# Patient Record
Sex: FEMALE | Race: WHITE | NOT HISPANIC OR LATINO | Employment: FULL TIME | URBAN - METROPOLITAN AREA
[De-identification: names, ages, dates, MRNs, and addresses within clinical notes are randomized per-mention and may not be internally consistent; named-entity substitution may affect disease eponyms.]

---

## 2017-01-12 DIAGNOSIS — F41.8 DEPRESSION WITH ANXIETY: Primary | ICD-10-CM

## 2017-01-12 RX ORDER — ALPRAZOLAM 0.5 MG
0.5 TABLET ORAL 3 TIMES DAILY PRN
Qty: 12 TABLET | Refills: 0 | Status: SHIPPED | OUTPATIENT
Start: 2017-01-12 | End: 2017-01-12

## 2017-01-12 RX ORDER — ALPRAZOLAM 0.5 MG
0.5 TABLET ORAL DAILY PRN
Qty: 12 TABLET | Refills: 0 | Status: SHIPPED | OUTPATIENT
Start: 2017-01-12 | End: 2017-02-13

## 2017-01-12 NOTE — TELEPHONE ENCOUNTER
Refill request for: ALPRAZOLAM 0.5 MG    Last rx written: 12/29/16 # 12 w/ 0 refills  **[place]  reviewed- last filled: 12/29/16  Last OV: 12/29/16  for complication of IUD    Unable to fill per standing order routed to Dr. David for approval.    Patient has psychiatry appointment February 1st, Dr. Villafana. Looking for refill until the established.     Fax RX to [facility].  Call when faxed [phone], ok to leave detailed message.     Kate Huizar RN

## 2017-01-12 NOTE — TELEPHONE ENCOUNTER
Agree with psychiatry referral for help with management.  Script printed, signed, and in station out basket.

## 2017-01-12 NOTE — TELEPHONE ENCOUNTER
Faxed RX to patients pharmacy, spoke to patient and informed her - verbalized understanding.     Lydia Talavera MA

## 2017-01-20 ENCOUNTER — TRANSFERRED RECORDS (OUTPATIENT)
Dept: HEALTH INFORMATION MANAGEMENT | Facility: CLINIC | Age: 32
End: 2017-01-20

## 2017-01-26 ENCOUNTER — TRANSFERRED RECORDS (OUTPATIENT)
Dept: HEALTH INFORMATION MANAGEMENT | Facility: CLINIC | Age: 32
End: 2017-01-26

## 2017-01-27 DIAGNOSIS — F98.8 ADD (ATTENTION DEFICIT DISORDER) WITHOUT HYPERACTIVITY: Primary | ICD-10-CM

## 2017-01-27 NOTE — TELEPHONE ENCOUNTER
Refill request for: ADDERALL 20 MG - 40 MG BID    Last rx written: 12/30/16 # 120    **[place]  reviewed- last filled: 12/30/16  Last OV: 5/25/16  for ADD    Unable to fill per standing order routed to Dr. David for approval.    Place RX up front. Call [phone] when this has been done.    Patient thought she needed a PA for this medication. On chart review, PA was done, see 11/10/16 telephone encounter or scanned in document from 12/2/16.    Kate Huizar RN

## 2017-01-30 RX ORDER — DEXTROAMPHETAMINE SACCHARATE, AMPHETAMINE ASPARTATE, DEXTROAMPHETAMINE SULFATE AND AMPHETAMINE SULFATE 5; 5; 5; 5 MG/1; MG/1; MG/1; MG/1
40 TABLET ORAL 2 TIMES DAILY
Qty: 120 TABLET | Refills: 0 | Status: SHIPPED | OUTPATIENT
Start: 2017-01-30 | End: 2017-02-28

## 2017-02-13 DIAGNOSIS — F41.8 DEPRESSION WITH ANXIETY: ICD-10-CM

## 2017-02-13 NOTE — TELEPHONE ENCOUNTER
FYI patient no showed her 2/1/17 psychiatry appointment.    Refill request for: ALPRAZOLAM 0.5 MG QD PRN    Last rx written: 1/12/17 # 12 w/ 0 refills.     **[place]  reviewed- last filled: 1/12/17  Last OV with Dr. David: 5/25/16 for physical    Unable to fill per standing order routed to Dr. David for approval.    Fax RX to Ira Huizar RN

## 2017-02-14 ENCOUNTER — OFFICE VISIT (OUTPATIENT)
Dept: FAMILY MEDICINE | Facility: CLINIC | Age: 32
End: 2017-02-14
Payer: COMMERCIAL

## 2017-02-14 VITALS
SYSTOLIC BLOOD PRESSURE: 112 MMHG | WEIGHT: 136 LBS | DIASTOLIC BLOOD PRESSURE: 68 MMHG | BODY MASS INDEX: 21.35 KG/M2 | HEIGHT: 67 IN | OXYGEN SATURATION: 98 % | TEMPERATURE: 98.8 F | HEART RATE: 100 BPM

## 2017-02-14 DIAGNOSIS — B96.89 BV (BACTERIAL VAGINOSIS): Primary | ICD-10-CM

## 2017-02-14 DIAGNOSIS — N76.0 BV (BACTERIAL VAGINOSIS): Primary | ICD-10-CM

## 2017-02-14 DIAGNOSIS — F43.0 ACUTE REACTION TO STRESS: ICD-10-CM

## 2017-02-14 DIAGNOSIS — F41.9 ANXIETY: ICD-10-CM

## 2017-02-14 DIAGNOSIS — K60.2 ANAL FISSURE: ICD-10-CM

## 2017-02-14 LAB
ALBUMIN UR-MCNC: 30 MG/DL
APPEARANCE UR: CLEAR
BACTERIA #/AREA URNS HPF: ABNORMAL /HPF
BILIRUB UR QL STRIP: NEGATIVE
COLOR UR AUTO: YELLOW
GLUCOSE UR STRIP-MCNC: NEGATIVE MG/DL
HGB UR QL STRIP: ABNORMAL
KETONES UR STRIP-MCNC: NEGATIVE MG/DL
LEUKOCYTE ESTERASE UR QL STRIP: NEGATIVE
MICRO REPORT STATUS: ABNORMAL
MUCOUS THREADS #/AREA URNS LPF: PRESENT /LPF
NITRATE UR QL: NEGATIVE
NON-SQ EPI CELLS #/AREA URNS LPF: ABNORMAL /LPF
PH UR STRIP: 7 PH (ref 5–7)
RBC #/AREA URNS AUTO: ABNORMAL /HPF (ref 0–2)
SP GR UR STRIP: 1.02 (ref 1–1.03)
SPECIMEN SOURCE: ABNORMAL
URN SPEC COLLECT METH UR: ABNORMAL
UROBILINOGEN UR STRIP-ACNC: 0.2 EU/DL (ref 0.2–1)
WBC #/AREA URNS AUTO: ABNORMAL /HPF (ref 0–2)
WET PREP SPEC: ABNORMAL

## 2017-02-14 PROCEDURE — 87210 SMEAR WET MOUNT SALINE/INK: CPT | Performed by: NURSE PRACTITIONER

## 2017-02-14 PROCEDURE — 99214 OFFICE O/P EST MOD 30 MIN: CPT | Performed by: NURSE PRACTITIONER

## 2017-02-14 PROCEDURE — 81001 URINALYSIS AUTO W/SCOPE: CPT | Performed by: NURSE PRACTITIONER

## 2017-02-14 RX ORDER — METRONIDAZOLE 500 MG/1
500 TABLET ORAL 2 TIMES DAILY
Qty: 14 TABLET | Refills: 0 | Status: SHIPPED | OUTPATIENT
Start: 2017-02-14 | End: 2017-05-28

## 2017-02-14 RX ORDER — LORAZEPAM 0.5 MG/1
0.5 TABLET ORAL EVERY 12 HOURS
Qty: 30 TABLET | Refills: 1 | Status: CANCELLED | OUTPATIENT
Start: 2017-02-14

## 2017-02-14 NOTE — TELEPHONE ENCOUNTER
Please contact patient to see when she has most recently seen psychiatry - based on the last notes, it was assumed that they would take over prescribing xanax.

## 2017-02-14 NOTE — PROGRESS NOTES
"  SUBJECTIVE:                                                    Citlali Hawley is a 31 year old female who presents to clinic today for the following health issues:      Vaginal Symptoms      Duration: x 7 days    Description  Odor and discharge    Intensity:  moderate    Accompanying signs and symptoms (fever/dysuria/abdominal or back pain): None    History  Sexually active: yes, single partner, contraception - IUD  Possibility of pregnancy: No  Recent antibiotic use: no     Precipitating or alleviating factors: None    Therapies tried and outcome: none      Patient is here with complaints of vaginal discharge that is thick and foul smelling. Sexually active with the same male partner for the past year. Never has had any type of discharge like this. Slight irritation with urination but no back pain or nausea. Has issues with an anal fissure as well. Given medication topically in the past. No douching but has used a new soap.         Problem list and histories reviewed & adjusted, as indicated.  Additional history: none    Problem list, Medication list, Allergies, and Medical/Social/Surgical histories reviewed in [facility] and updated as appropriate.    ROS:  Constitutional, HEENT, cardiovascular, pulmonary, gi and gu systems are negative, except as otherwise noted.    OBJECTIVE:                                                    /68 (BP Location: Right arm, Patient Position: Chair, Cuff Size: Adult Regular)  Pulse 100  Temp 98.8  F (37.1  C) (Oral)  Ht 5' 6.75\" (1.695 m)  Wt 136 lb (61.7 kg)  SpO2 98%  BMI 21.46 kg/m2  Body mass index is 21.46 kg/(m^2).  GENERAL: healthy, alert and no distress  BACK: no CVA tenderness, no paralumbar tenderness  PSYCH: mentation appears normal, affect normal/bright    Diagnostic Test Results:  Results for orders placed or performed in visit on 02/14/17 (from the past 24 hour(s))   *UA reflex to Microscopic and Culture ([facility] (except [facility]" and [place])   Result Value Ref Range    Color Urine Yellow     Appearance Urine Clear     Glucose Urine Negative NEG mg/dL    Bilirubin Urine Negative NEG    Ketones Urine Negative NEG mg/dL    Specific Gravity Urine 1.025 1.003 - 1.035    Blood Urine Trace (A) NEG    pH Urine 7.0 5.0 - 7.0 pH    Protein Albumin Urine 30 (A) NEG mg/dL    Urobilinogen Urine 0.2 0.2 - 1.0 EU/dL    Nitrite Urine Negative NEG    Leukocyte Esterase Urine Negative NEG    Source Midstream Urine    Urine Microscopic   Result Value Ref Range    WBC Urine 5-10 (A) 0 - 2 /HPF    RBC Urine 2-5 (A) 0 - 2 /HPF    Squamous Epithelial /LPF Urine Few FEW /LPF    Bacteria Urine Few (A) NEG /HPF    Mucous Urine Present (A) NEG /LPF   Wet prep   Result Value Ref Range    Specimen Description Vagina     Wet Prep (A)      No Trichomonas seen  Clue cells seen  No yeast seen      Micro Report Status FINAL 02/14/2017         ASSESSMENT/PLAN:                                                            1. BV (bacterial vaginosis)  Encouraged use of probiotics and avoiding all scented soaps and detergents.   - metroNIDAZOLE (FLAGYL) 500 MG tablet; Take 1 tablet (500 mg) by mouth 2 times daily  Dispense: 14 tablet; Refill: 0    2. Anal fissure  Will try anusol for fissure discomfort. Emphasis on avoiding constipation thru good water intake and regular exercise.   - hydrocortisone (ANUSOL-HC) 2.5 % cream; Place rectally 2 times daily  Dispense: 15 g; Refill: 0    Follow up if symptoms do not improve.     Juju Cuello, NP  [facility]

## 2017-02-14 NOTE — TELEPHONE ENCOUNTER
Patient calling to check on status.     She reports she had a RN exam on 2/1/17, so she couldn't make the psychiatry appointment. She has rescheduled the appointment for 2/22/17.

## 2017-02-14 NOTE — NURSING NOTE
"Chief Complaint   Patient presents with     UTI     possible       Initial /68 (BP Location: Right arm, Patient Position: Chair, Cuff Size: Adult Regular)  Pulse 100  Temp 98.8  F (37.1  C) (Oral)  Ht 5' 6.75\" (1.695 m)  Wt 136 lb (61.7 kg)  SpO2 98%  BMI 21.46 kg/m2 Estimated body mass index is 21.46 kg/(m^2) as calculated from the following:    Height as of this encounter: 5' 6.75\" (1.695 m).    Weight as of this encounter: 136 lb (61.7 kg).  Medication Reconciliation: complete     Gorge Ogden CMA    advsided is due as of 2/14/2017.Gorge Ogden CMA      "

## 2017-02-14 NOTE — TELEPHONE ENCOUNTER
LORazepam (ATIVAN) 0.5 MG tablet  Last Written Prescription Date:  2/11/16  Last Fill Quantity: 42,   # refills: 0  Last Office Visit with [facility], [facility] or [facility] prescribing provider: 11/15/16 Shira Guevara and 5/25/16 Dr. David  Future Office visit:       Routing refill request to provider for review/approval because:  Drug not on the [facility], [facility] or [facility] refill protocol or controlled substance

## 2017-02-14 NOTE — MR AVS SNAPSHOT
"              After Visit Summary   2017    Citlali Hawley    MRN: [id number]           Patient Information     Date Of Birth          1985        Visit Information        Provider Department      2017 11:20 AM Juju Cuello NP [facility]        Today's Diagnoses     Vaginal discharge    -  1    BV (bacterial vaginosis)        Anal fissure           Follow-ups after your visit        Who to contact     If you have questions or need follow up information about today's clinic visit or your schedule please contact [facility] directly at [phone].  Normal or non-critical lab and imaging results will be communicated to you by [employer], letter or phone within 4 business days after the clinic has received the results. If you do not hear from us within 7 days, please contact the clinic through [employer] or phone. If you have a critical or abnormal lab result, we will notify you by phone as soon as possible.  Submit refill requests through [employer] or call your pharmacy and they will forward the refill request to us. Please allow 3 business days for your refill to be completed.          Additional Information About Your Visit        MyChart Information     [employer] lets you send messages to your doctor, view your test results, renew your prescriptions, schedule appointments and more. To sign up, go to [email or web address] . Click on \"Log in\" on the left side of the screen, which will take you to the Welcome page. Then click on \"Sign up Now\" on the right side of the page.     You will be asked to enter the access code listed below, as well as some personal information. Please follow the directions to create your username and password.     Your access code is: [email or web address]  Expires: 3/29/2017  5:05 PM     Your access code will  in 90 days. If you need help or a new code, please call your [facility] or [phone].        Care EveryWhere ID     This is your Care " "EveryWhere ID. This could be used by other organizations to access your [facility] medical records  [id number]        Your Vitals Were     Pulse Temperature Height Pulse Oximetry BMI (Body Mass Index)       100 98.8  F (37.1  C) (Oral) 5' 6.75\" (1.695 m) 98% 21.46 kg/m2        Blood Pressure from Last 3 Encounters:   02/14/17 112/68   12/29/16 100/80   11/15/16 102/58    Weight from Last 3 Encounters:   02/14/17 136 lb (61.7 kg)   12/29/16 142 lb 4.8 oz (64.5 kg)   11/15/16 149 lb 1.6 oz (67.6 kg)              We Performed the Following     *UA reflex to Microscopic and Culture ([facility] and [facility] (except [place] and [place])     Urine Microscopic     Wet prep          Today's Medication Changes          These changes are accurate as of: 2/14/17 12:11 PM.  If you have any questions, ask your nurse or doctor.               Start taking these medicines.        Dose/Directions    hydrocortisone 2.5 % cream   Commonly known as:  ANUSOL-HC   Used for:  Anal fissure   Started by:  Juju Cuello NP        Place rectally 2 times daily   Quantity:  15 g   Refills:  0       metroNIDAZOLE 500 MG tablet   Commonly known as:  FLAGYL   Used for:  BV (bacterial vaginosis)   Started by:  Juju Cuello NP        Dose:  500 mg   Take 1 tablet (500 mg) by mouth 2 times daily   Quantity:  14 tablet   Refills:  0            Where to get your medicines      These medications were sent to [id number] - [place] - [address]  AT [address] [place] [address] [place] [address]     Phone:  [phone]     hydrocortisone 2.5 % cream    metroNIDAZOLE 500 MG tablet                Primary Care Provider Office Phone # Fax #    Serina David [phone]       [facility] [address] [place] [address]        Thank you!     Thank you for choosing [facility]  for your care. Our goal is always to provide you with excellent " care. Hearing back from our patients is one way we can continue to improve our services. Please take a few minutes to complete the written survey that you may receive in the mail after your visit with us. Thank you!             Your Updated Medication List - Protect others around you: Learn how to safely use, store and throw away your medicines at www.disposemymeds.org.          This list is accurate as of: 2/14/17 12:11 PM.  Always use your most recent med list.                   Brand Name Dispense Instructions for use    ALPRAZolam 0.5 MG tablet    XANAX    12 tablet    Take 1 tablet (0.5 mg) by mouth daily as needed for anxiety       amphetamine-dextroamphetamine 20 MG per tablet    ADDERALL    120 tablet    Take 2 tablets (40 mg) by mouth 2 times daily In the afternoon. Patient needs generic brand with BARR        FETZIMA 40 MG 24 hr capsule   Generic drug:  levomilnacipran      TK 1 C PO D       hydrocortisone 2.5 % cream    ANUSOL-HC    15 g    Place rectally 2 times daily       lamoTRIgine 25 MG tablet    LaMICtal         levonorgestrel 20 MCG/24HR IUD    MIRENA    1 each    1 each by Intrauterine route once.       metroNIDAZOLE 500 MG tablet    FLAGYL    14 tablet    Take 1 tablet (500 mg) by mouth 2 times daily

## 2017-02-15 RX ORDER — ALPRAZOLAM 0.5 MG
0.5 TABLET ORAL DAILY PRN
Qty: 10 TABLET | Refills: 0 | Status: SHIPPED | OUTPATIENT
Start: 2017-02-15 | End: 2017-06-13

## 2017-02-15 NOTE — TELEPHONE ENCOUNTER
Script printed, signed, and in station out basket.    Needs to be seen either by psychiatry or here prior to receiving any additional refills.

## 2017-02-15 NOTE — TELEPHONE ENCOUNTER
Patient calling stating she is leaving town in one hour.  RX faxed to pharmacy.  Notified her to schedule appointment prior to needing further refills.  Voices understanding of instructions.  LAMONT Gray RN

## 2017-02-16 DIAGNOSIS — F41.9 ANXIETY: ICD-10-CM

## 2017-02-16 DIAGNOSIS — F43.0 ACUTE REACTION TO STRESS: ICD-10-CM

## 2017-02-16 RX ORDER — LORAZEPAM 0.5 MG/1
0.5 TABLET ORAL EVERY 12 HOURS
Qty: 42 TABLET | Refills: 0 | Status: CANCELLED | OUTPATIENT
Start: 2017-02-16

## 2017-02-16 NOTE — TELEPHONE ENCOUNTER
Please call patient at [phone] regarding taking both medications, LORazepam (ATIVAN) 0.5 MG tablet & ALPRAZolam (XANAX) 0.5 MG tablet.  Patient says needs to take both. Please call patient to advise and clarify.    Thank you,    Anali Jung

## 2017-02-16 NOTE — TELEPHONE ENCOUNTER
Agree with below - can't be on both ativan and xanax.  Xanax was just refilled.  Needs to follow up with psychiatry.

## 2017-02-16 NOTE — TELEPHONE ENCOUNTER
I explained to the patient the other day that she can't take both of these medications, just one or the other and that we have only refilled the Alprazolam recently for her.   Will route to Dr. David.

## 2017-02-28 DIAGNOSIS — F98.8 ADD (ATTENTION DEFICIT DISORDER) WITHOUT HYPERACTIVITY: ICD-10-CM

## 2017-02-28 RX ORDER — DEXTROAMPHETAMINE SACCHARATE, AMPHETAMINE ASPARTATE, DEXTROAMPHETAMINE SULFATE AND AMPHETAMINE SULFATE 5; 5; 5; 5 MG/1; MG/1; MG/1; MG/1
40 TABLET ORAL 2 TIMES DAILY
Qty: 120 TABLET | Refills: 0 | Status: SHIPPED | OUTPATIENT
Start: 2017-02-28 | End: 2017-03-27

## 2017-02-28 NOTE — TELEPHONE ENCOUNTER
Refill request for: ADDERALL 20 MG - 40 MG BID    Last rx written: 1/30/17 # 120    **[place]  reviewed- last filled: 1/30/17  Last OV: 11/15/16  for depression w/ anxiety    Unable to fill per standing order routed to Dr. David for approval.    Place RX up front. Call [phone] when this has been done.    Kate Huizar RN

## 2017-03-27 DIAGNOSIS — F98.8 ADD (ATTENTION DEFICIT DISORDER) WITHOUT HYPERACTIVITY: ICD-10-CM

## 2017-03-27 RX ORDER — DEXTROAMPHETAMINE SACCHARATE, AMPHETAMINE ASPARTATE, DEXTROAMPHETAMINE SULFATE AND AMPHETAMINE SULFATE 5; 5; 5; 5 MG/1; MG/1; MG/1; MG/1
40 TABLET ORAL 2 TIMES DAILY
Qty: 120 TABLET | Refills: 0 | Status: SHIPPED | OUTPATIENT
Start: 2017-03-27 | End: 2017-04-24

## 2017-03-27 NOTE — TELEPHONE ENCOUNTER
Patient will  at . [phone] ok to lm.   Adderall 20 MG      Last Written Prescription Date:  2/28/17  Last Fill Quantity: 120,   # refills: 0  Last Office Visit with [facility], [facility] or  Health prescribing provider: 2/14/17  Future Office visit:      checked    Routing refill request to provider for review/approval because:  Drug not on the [facility], [facility] or  [email or web address] refill protocol or controlled substance

## 2017-04-20 ENCOUNTER — TRANSFERRED RECORDS (OUTPATIENT)
Dept: HEALTH INFORMATION MANAGEMENT | Facility: CLINIC | Age: 32
End: 2017-04-20

## 2017-04-24 DIAGNOSIS — F98.8 ADD (ATTENTION DEFICIT DISORDER) WITHOUT HYPERACTIVITY: ICD-10-CM

## 2017-04-24 NOTE — TELEPHONE ENCOUNTER
amphetamine-dextroamphetamine (ADDERALL) 20 MG per tablet      Last Written Prescription Date: 03/27/17  Last Fill Quantity: 120,  # refills: 0   Last Office Visit with [facility], [facility] or [facility] prescribing provider: 11/15/16                                              Call patient when ready.  Knows to  downstairs.

## 2017-04-25 RX ORDER — DEXTROAMPHETAMINE SACCHARATE, AMPHETAMINE ASPARTATE, DEXTROAMPHETAMINE SULFATE AND AMPHETAMINE SULFATE 5; 5; 5; 5 MG/1; MG/1; MG/1; MG/1
40 TABLET ORAL 2 TIMES DAILY
Qty: 120 TABLET | Refills: 0 | Status: SHIPPED | OUTPATIENT
Start: 2017-04-25 | End: 2017-05-22

## 2017-05-22 DIAGNOSIS — F98.8 ADD (ATTENTION DEFICIT DISORDER) WITHOUT HYPERACTIVITY: ICD-10-CM

## 2017-05-22 RX ORDER — DEXTROAMPHETAMINE SACCHARATE, AMPHETAMINE ASPARTATE, DEXTROAMPHETAMINE SULFATE AND AMPHETAMINE SULFATE 5; 5; 5; 5 MG/1; MG/1; MG/1; MG/1
40 TABLET ORAL 2 TIMES DAILY
Qty: 120 TABLET | Refills: 0 | Status: SHIPPED | OUTPATIENT
Start: 2017-05-23 | End: 2017-06-27

## 2017-05-22 NOTE — TELEPHONE ENCOUNTER
Patient calling requesting refill and she would like to pick the script up at the  by tomorrow. Please call her when ready #[phone].    Adderall    Last Written Prescription Date:  4/25/17  Last Fill Quantity: 120,   # refills: 0  Last Office Visit with [facility] or  Health prescribing provider: 2/14/17  Future Office visit:       Routing refill request to provider for review/approval because:  Drug not on the [facility], [facility] or [employer] refill protocol or controlled substance

## 2017-05-22 NOTE — TELEPHONE ENCOUNTER
Placed RX at  , spoke with pt and informed her of placement. Also informed pt she is due for OV- scheduled 06/27/17. Pt also inquiring if she is able to make appt for IUD removal and replacement. Huddled with Lucho to schedule on AMS schedule, appt made for 05/30/17.    Lydia Talavera MA

## 2017-05-22 NOTE — TELEPHONE ENCOUNTER
Patient due for ADD follow up - please remind her to schedule prior to her next refill    Script printed, signed, and in station out basket.

## 2017-05-23 ENCOUNTER — TELEPHONE (OUTPATIENT)
Dept: PEDIATRICS | Facility: CLINIC | Age: 32
End: 2017-05-23

## 2017-05-23 NOTE — TELEPHONE ENCOUNTER
I will start PA process. Internet is down so I am unable to do it through cover my meds. I will call insurance listed in patient's chart.    Wen Nichole MA

## 2017-05-23 NOTE — TELEPHONE ENCOUNTER
PA wait time is 40 min I will either call back later or start PA process under cover my med's when the Internet is working. Not in clinic tomorrow so if this is not done by me FYI to start PA please.    Wen Nichole MA

## 2017-05-23 NOTE — TELEPHONE ENCOUNTER
No PA information noted below, called pharmacy to verify PA information.       Medication: amphetamine-dextroamphetamine (ADDERALL) 20 MG per tablet  Insurance phone # : [phone]  Insurance ID: [id number]    Called insurance and attempted to initiate PA, representative stated PA on file is still active and does not  until 17. Representative stated pharmacy needs to call help desk at [phone]. For future reference, earliest PA can be done is 17(phone number is listed above). Called pharmacy and informed them pharmacy help desk needs to be contacted(provider number as well).     No PA needed.     Lydia Talavera MA

## 2017-05-23 NOTE — TELEPHONE ENCOUNTER
Patient calling to report PA needed for Adderall.     Routing to [facility] [employer] to initiate PA. I will also try to call myself. Did inform patient that it can take 24-72 hours for the insurance company to make a decision.

## 2017-05-27 ENCOUNTER — HOSPITAL ENCOUNTER (EMERGENCY)
Facility: CLINIC | Age: 32
Discharge: HOME | End: 2017-05-28
Attending: EMERGENCY MEDICINE | Admitting: EMERGENCY MEDICINE
Payer: COMMERCIAL

## 2017-05-27 DIAGNOSIS — E87.6 HYPOKALEMIA: ICD-10-CM

## 2017-05-27 DIAGNOSIS — F41.9 ANXIETY: ICD-10-CM

## 2017-05-27 LAB
AMPHETAMINES UR QL SCN: ABNORMAL
ANION GAP SERPL CALCULATED.3IONS-SCNC: 10 MMOL/L (ref 3–14)
BARBITURATES UR QL: ABNORMAL
BASOPHILS # BLD AUTO: 0 10E9/L (ref 0–0.2)
BASOPHILS NFR BLD AUTO: 0.7 %
BENZODIAZ UR QL: ABNORMAL
BUN SERPL-MCNC: 22 MG/DL (ref 7–30)
CALCIUM SERPL-MCNC: 9 MG/DL (ref 8.5–10.1)
CANNABINOIDS UR QL SCN: ABNORMAL
CHLORIDE SERPL-SCNC: 103 MMOL/L (ref 94–109)
CO2 SERPL-SCNC: 27 MMOL/L (ref 20–32)
COCAINE UR QL: ABNORMAL
CREAT SERPL-MCNC: 0.94 MG/DL (ref 0.52–1.04)
DIFFERENTIAL METHOD BLD: ABNORMAL
EOSINOPHIL # BLD AUTO: 0.1 10E9/L (ref 0–0.7)
EOSINOPHIL NFR BLD AUTO: 1.1 %
ERYTHROCYTE [DISTWIDTH] IN BLOOD BY AUTOMATED COUNT: 12.5 % (ref 10–15)
GFR SERPL CREATININE-BSD FRML MDRD: 69 ML/MIN/1.7M2
GLUCOSE SERPL-MCNC: 81 MG/DL (ref 70–99)
HCG UR QL: NEGATIVE
HCT VFR BLD AUTO: 33.2 % (ref 35–47)
HGB BLD-MCNC: 11.5 G/DL (ref 11.7–15.7)
IMM GRANULOCYTES # BLD: 0 10E9/L (ref 0–0.4)
IMM GRANULOCYTES NFR BLD: 0.2 %
LYMPHOCYTES # BLD AUTO: 2.3 10E9/L (ref 0.8–5.3)
LYMPHOCYTES NFR BLD AUTO: 41.9 %
MCH RBC QN AUTO: 31.8 PG (ref 26.5–33)
MCHC RBC AUTO-ENTMCNC: 34.6 G/DL (ref 31.5–36.5)
MCV RBC AUTO: 92 FL (ref 78–100)
MONOCYTES # BLD AUTO: 0.8 10E9/L (ref 0–1.3)
MONOCYTES NFR BLD AUTO: 13.7 %
NEUTROPHILS # BLD AUTO: 2.3 10E9/L (ref 1.6–8.3)
NEUTROPHILS NFR BLD AUTO: 42.4 %
NRBC # BLD AUTO: 0 10*3/UL
NRBC BLD AUTO-RTO: 0 /100
OPIATES UR QL SCN: ABNORMAL
PCP UR QL SCN: ABNORMAL
PLATELET # BLD AUTO: 190 10E9/L (ref 150–450)
POTASSIUM SERPL-SCNC: 2.9 MMOL/L (ref 3.4–5.3)
RBC # BLD AUTO: 3.62 10E12/L (ref 3.8–5.2)
SODIUM SERPL-SCNC: 140 MMOL/L (ref 133–144)
TSH SERPL DL<=0.05 MIU/L-ACNC: 0.81 MU/L (ref 0.4–4)
WBC # BLD AUTO: 5.5 10E9/L (ref 4–11)

## 2017-05-27 PROCEDURE — 84443 ASSAY THYROID STIM HORMONE: CPT | Performed by: EMERGENCY MEDICINE

## 2017-05-27 PROCEDURE — 80307 DRUG TEST PRSMV CHEM ANLYZR: CPT | Performed by: EMERGENCY MEDICINE

## 2017-05-27 PROCEDURE — 80048 BASIC METABOLIC PNL TOTAL CA: CPT | Performed by: EMERGENCY MEDICINE

## 2017-05-27 PROCEDURE — 90791 PSYCH DIAGNOSTIC EVALUATION: CPT

## 2017-05-27 PROCEDURE — 85025 COMPLETE CBC W/AUTO DIFF WBC: CPT | Performed by: EMERGENCY MEDICINE

## 2017-05-27 PROCEDURE — 99285 EMERGENCY DEPT VISIT HI MDM: CPT | Mod: 25

## 2017-05-27 PROCEDURE — 81025 URINE PREGNANCY TEST: CPT | Performed by: EMERGENCY MEDICINE

## 2017-05-27 RX ORDER — LIDOCAINE 40 MG/G
CREAM TOPICAL
Status: DISCONTINUED | OUTPATIENT
Start: 2017-05-27 | End: 2017-05-28 | Stop reason: HOSPADM

## 2017-05-27 ASSESSMENT — ENCOUNTER SYMPTOMS
FREQUENCY: 0
NERVOUS/ANXIOUS: 1
DIARRHEA: 0
VOMITING: 0
HEMATURIA: 0
SLEEP DISTURBANCE: 1
DYSURIA: 0

## 2017-05-27 NOTE — LETTER
[facility] EMERGENCY DEPARTMENT  [address]  [facility] [address]  [phone]    May 28, 2017    Citlali Hawley  [address]   [facility] [place] [facility] [address]  [phone] (home) none (work)    : 1985      To Whom it may concern:    Citlali Hawley was seen in our Emergency Department today, May 28, 2017. She may return to work on Monday, May 29th.     Sincerely,        Ernestine Boateng MD

## 2017-05-27 NOTE — ED AVS SNAPSHOT
[facility] Emergency Department    [address]    [facility] [address]    Phone:  [phone]    Fax:  [phone]                                       Citlali Hawley   MRN: [id number]    Department:  [facility] Emergency Department   Date of Visit:  5/27/2017           After Visit Summary Signature Page     I have received my discharge instructions, and my questions have been answered. I have discussed any challenges I see with this plan with the nurse or doctor.    ..........................................................................................................................................  Patient/Patient Representative Signature      ..........................................................................................................................................  Patient Representative Print Name and Relationship to Patient    ..................................................               ................................................  Date                                            Time    ..........................................................................................................................................  Reviewed by Signature/Title    ...................................................              ..............................................  Date                                                            Time

## 2017-05-27 NOTE — LETTER
[facility] EMERGENCY DEPARTMENT  [address] [place] [address]  [phone]    May 28, 2017    Citlali Hawley  [address]   [facility] [address]  [phone] (home) none (work)    : 1985      To Whom it may concern:    Citlali Hawley was seen in our Emergency Department today, May 28, 2017.  I expect her condition to improve over the next 2 days.  She may return to work/school when improved.    Sincerely,        Sheri Olea

## 2017-05-27 NOTE — LETTER
[facility] EMERGENCY DEPARTMENT  [address]  [facility] [address]  [phone]    May 28, 2017    Citlali Hawley  [address]   [facility] [address]  [phone] (home) none (work)    : 1985      To Whom it may concern:    Citlali Hawley was seen in our Emergency Department today, May 28, 2017.  I expect her condition to improve over the next  day.  She may return to work/school when improved.    Sincerely,        Sheri Olea

## 2017-05-28 VITALS
DIASTOLIC BLOOD PRESSURE: 70 MMHG | SYSTOLIC BLOOD PRESSURE: 116 MMHG | HEART RATE: 89 BPM | OXYGEN SATURATION: 99 % | TEMPERATURE: 97.7 F | RESPIRATION RATE: 18 BRPM

## 2017-05-28 PROCEDURE — 25000132 ZZH RX MED GY IP 250 OP 250 PS 637: Performed by: EMERGENCY MEDICINE

## 2017-05-28 RX ORDER — POTASSIUM CHLORIDE 1.5 G/1.58G
40 POWDER, FOR SOLUTION ORAL ONCE
Status: COMPLETED | OUTPATIENT
Start: 2017-05-28 | End: 2017-05-28

## 2017-05-28 RX ORDER — POTASSIUM CHLORIDE 1500 MG/1
20 TABLET, EXTENDED RELEASE ORAL 2 TIMES DAILY
Qty: 10 TABLET | Refills: 0 | Status: SHIPPED | OUTPATIENT
Start: 2017-05-28 | End: 2017-06-02

## 2017-05-28 RX ADMIN — POTASSIUM CHLORIDE 40 MEQ: 1.5 POWDER, FOR SOLUTION ORAL at 00:23

## 2017-05-28 NOTE — ED NOTES
Pt presents to ED with panic attacks and anxiety. Has history of this but has been getting worse yesterday. Reports she has taken her normal medications as she is prescribed. When she has episodes she usually calls her primary for a small fill of xanax but usually her anxiety is controlled with the meds she has at home.

## 2017-05-28 NOTE — ED PROVIDER NOTES
History     Chief Complaint:  Anxiety    HPI   Citlali Hawley is a 32 year old female with a history of panic disorder, depression, ADD, and eating disorder who presents to the emergency department for evaluation of anxiety. Of note, the patient states that she has a history of panic disorder with anxiety and is currently prescribed Lamictal and Fetzima through a NP at [facility], which she has been taking as prescribed. She was prescribed Xanax previously, though states that she is currently out of this medication. The patient notes that she has had increasing anxiety with panic attacks over the past 3-4 weeks, explaining that she has been unable to sleep well as a result of this anxiety. She notes that she has been under more stress as of late, citing job stress and issues within her family as well. Her increasing anxiety prompted her ED visit this evening. She denies any recent vomiting, diarrhea, or urinary symptoms. Of note, the patient notes that she had low potassium last month on her blood work at [facility] and has been taking supplemental potassium chloride since then. She states that the etiology of this is not clear.     Allergies:  Codeine Sulfate  Penicillins    Medications:    Adderall  Xanax  Hydrocortisone  Fetzima  Mirena  Lamictal   Potassium Chloride     Past Medical History:    cervical intraepithelial neoplasia grade 3  Depression  Eating disorder  Panic disorder  ADD  Suicide attempt    Past Surgical History:    LEEP  Tonsillectomy    Family History:    Depression    Social History:  Presents with her significant other, Ramiro.  Current Every day smoker, 0.50 ppd.  Positive for alcohol use.   Marital Status:  Single [1]    Review of Systems   Gastrointestinal: Negative for diarrhea and vomiting.   Genitourinary: Negative for dysuria, frequency and hematuria.   Psychiatric/Behavioral: Positive for sleep disturbance. Negative for suicidal ideas. The patient is  nervous/anxious.    All other systems reviewed and are negative.    Physical Exam   First Vitals:  BP: 121/84  Pulse: 89  Heart Rate: 89  Temp: 97.7  F (36.5  C)  Resp: 18  SpO2: 99 %     Physical Exam    Nursing note and vitals reviewed.    Constitutional: Well groomed. Fidgety.          HENT:    Mouth/Throat: Oropharynx is without swelling or erythema. Oral mucosa moist.    Eyes: Conjunctivae normal are normal. No scleral icterus.    Neck: Neck supple.   Cardiovascular: Normal rate, regular rhythm and intact distal pulses.    Pulmonary/Chest: Effort normal and breath sounds normal.   Abdominal: Soft.  No distension. There is no tenderness.   Musculoskeletal:  No edema, No calf tenderness  Neurological:Alert and oriented. Coordination normal. Normal, steady gait.     Skin: Skin is warm and dry.   Psychiatric: Normal mood and anxious affect.     Emergency Department Course   Laboratory:  CBC: WBC: 5.5, HGB: 11.5 (L), PLT: 190  BMP: Potassium 2.9 (L), o/w WNL (Creatinine: 0.94)    TSH: 0.81    Drug abuse screen (urine):Positive for amphetamines, o/w negative    HCG qualitative urine: Negative    Interventions:  0023 Potassium Chloride 40 mEq PO    Emergency Department Course:  Nursing notes and vitals reviewed. I performed an exam of the patient as documented above.     The patient provided a urine sample here in the emergency department. This was sent for laboratory testing, findings above.     IV inserted. Medicine administered as documented above. Blood drawn. This was sent to the lab for further testing, results above.    2318 I consulted with Danielle of [facility] regarding the patient's history and presentation here in the emergency department. They agreed to evaluate the patient here in the ED.    2345 I spoke with DEC following their assessment of the patient. We are in agreement with plan for discharge home.     0020 I rechecked the patient and discussed the results of her workup thus far.     0058 I reevaluated the  patient and provided an update in regards to her ED course.      Findings and plan explained to the Patient. Patient discharged home with instructions regarding supportive care, medications, and reasons to return. The importance of close follow-up was reviewed. The patient was prescribed Potassium Chloride.    I personally reviewed the laboratory results with the Patient and answered all related questions prior to discharge.     Impression & Plan    Medical Decision Making:  Citlali Hawley is a 32 year old female who presents with report of uncontrolled anxiety and panic attacks for the past month, triggered by significant stressors at work and with her family. Additionally, she had some concern over pregnancy and requested evaluation of her potassium as she stated she was hypokalemic one month ago when seen at [facility].   have been unable to confirm that as we have been unable to get results from that visit. Reviewing her medical records, it does show that she has a history of bulimia which is likely contributing to this. Not able to find any recent labs in [facility] or through care everywhere I also checked a CBC to evaluate for side effects of her medications. She was evaluated by [facility]; please refer to DEC assessment for details. The DEC  and I are in agreement that she is not currently unsafe to herself or others. She has been provided follow up appointment with psychiatry and therapy on Tuesday, which is three days from now. Her potassium was 2.9 today compared to reported 2.1 one month ago. She inconsistently reports if she is taking potassium at home. I have prescribed potassium replacement for the next 5 days and have recommended high potassium diet. She understands to return for new or worsening symptoms and can otherwise follow up for repeat potassium level in 3-5 days with primary and psychiatry and therapy per [facility] on Tuesday as scheduled.     Diagnosis:    ICD-10-CM   1. Anxiety F41.9    2. Hypokalemia E87.6       Disposition:  discharged to home    Discharge Medications:   Details   potassium chloride SA (POTASSIUM CHLORIDE) 20 MEQ CR tablet Take 1 tablet (20 mEq) by mouth 2 times daily for 5 days, Disp-10 tablet, R-0, Local Print     I, Tiffany Snider, am serving as a scribe on 5/27/2017 at 10:47 PM to personally document services performed by Ernestine Boateng MD based on my observations and the provider's statements to me.     Tiffany Snider  5/27/2017   [facility] EMERGENCY DEPARTMENT       Ernestine Boateng MD  05/28/17 0144

## 2017-05-28 NOTE — DISCHARGE INSTRUCTIONS
Understanding Anxiety Disorders  Almost everyone gets nervous now and then. It s normal to have knots in your stomach before a test, or for your heart to race on a first date. But an anxiety disorder is much more than a case of nerves. In fact, its symptoms may be overwhelming. But treatment can relieve many of these symptoms. Talking to your doctor is the first step.    What are anxiety disorders?  An anxiety disorder causes intense feelings of panic and fear. These feelings may arise for no apparent reason. And they tend to recur again and again. They may prevent you from coping with life and cause you great distress. As a result, you may avoid anything that triggers your fear. In extreme cases, you may never leave the house. Anxiety disorders may cause other symptoms, such as:    Obsessive thoughts you can t control    Constant nightmares or painful thoughts of the past    Nausea, sweating, and muscle tension    Difficulty sleeping or concentrating  What causes anxiety disorders?  Anxiety disorders tend to run in families. For some people, childhood abuse or neglect may play a role. For others, stressful life events or trauma may trigger anxiety disorders. Anxiety can trigger low self-esteem and poor coping skills.  Common anxiety disorders    Panic disorder: This causes an intense fear of being in danger.    Phobias: These are extreme fears of certain objects, places, or events.    Obsessive-compulsive disorder: This causes you to have unwanted thoughts. You also may perform certain actions over and over.    Posttraumatic stress disorder: This occurs in people who have survived a terrible ordeal. It can cause nightmares and flashbacks about the event.    Generalized anxiety disorder: This causes constant worry that can greatly disrupt your life.   Getting better  You may believe that nothing can help you. Or, you might fear what others may think. But most anxiety symptoms can be eased. Having an anxiety  disorder is nothing to be ashamed of. Most people do best with treatment that combines medication and therapy. Although these aren t cures, they can help you live a healthier life.    5186-9335 [employer]. [address] [place] [address], [place] [address]. All rights reserved. This information is not intended as a substitute for professional medical care. Always follow your healthcare professional's instructions.          Treating Anxiety Disorders with Therapy  If you have an anxiety disorder, you don t have to suffer anymore. Treatment is available. Therapy (also called counseling) is often a helpful treatment for anxiety disorders. With therapy, a specially trained professional (therapist) helps you face and learn to manage your anxiety. Therapy can be short-term or long-term depending on your needs. In some cases, medication may also be prescribed with therapy. It may take time before you notice how much therapy is helping, but stick with it. With therapy, you can feel better.    Cognitive behavioral therapy (CBT)  Cognitive behavioral therapy (CBT) teaches you to manage anxiety. It does this by helping you understand how you think and act when you re anxious. Research has shown CBT to be a very effective treatment for anxiety disorders. How CBT is run is almost like a class. It involves homework and activities to build skills that teach you to cope with anxiety step by step. It can be done in a group or one-on-one, and often takes place for a set number of sessions. CBT has two main parts:    Cognitive therapy helps you identify the negative, irrational thoughts that occur with your anxiety. You ll learn to replace these with more positive, realistic thoughts.    Behavioral therapy helps you change how you react to anxiety. You ll learn coping skills and methods for relaxing to help you better deal with anxiety.  Other forms of therapy  Other therapy methods may work better for you than CBT. Or, you may  move from CBT to another form of therapy as your treatment needs change. This may mean meeting with a therapist by yourself or in a group. Therapy can also help you work through problems in your life, such as drug or alcohol dependence, that may be making your anxiety worse.  Getting better takes time  Therapy will help you feel better and teach you skills to help manage anxiety long term. But change doesn t happen right away. It takes a commitment from you. And treatment only works if you learn to face the causes of your anxiety. So, you might feel worse before you feel better. This can sometimes make it hard to stick with it. But remember: Therapy is a very effective treatment. The results will be well worth it.  Helping yourself  If anxiety is wearing you down, here are some things you can do to cope:    Don t fight your feelings. Anxiety feeds itself. The more you worry about it, the worse it gets. Instead, try to identify what might have triggered your anxiety. Then try to put this threat in perspective.    Keep in mind that you can t control everything about a situation. Change what you can and let the rest take its course.    Exercise -- it s a great way to relieve tension and help your body feel relaxed.    Examine your life for stress, and try to find ways to reduce it.    Avoid caffeine and nicotine, which can make anxiety symptoms worse.    Fight the temptation to turn to alcohol or unprescribed drugs for relief. They only make things worse in the long run.     8801-5062 [employer] [address] [place] [address], [place] [address]. All rights reserved. This information is not intended as a substitute for professional medical care. Always follow your healthcare professional's instructions.

## 2017-06-13 ENCOUNTER — OFFICE VISIT (OUTPATIENT)
Dept: FAMILY MEDICINE | Facility: CLINIC | Age: 32
End: 2017-06-13
Payer: COMMERCIAL

## 2017-06-13 VITALS
HEIGHT: 68 IN | DIASTOLIC BLOOD PRESSURE: 71 MMHG | HEART RATE: 105 BPM | SYSTOLIC BLOOD PRESSURE: 110 MMHG | RESPIRATION RATE: 16 BRPM | WEIGHT: 137 LBS | BODY MASS INDEX: 20.76 KG/M2 | TEMPERATURE: 97.7 F

## 2017-06-13 DIAGNOSIS — F41.8 DEPRESSION WITH ANXIETY: ICD-10-CM

## 2017-06-13 DIAGNOSIS — Z01.419 ENCOUNTER FOR GYNECOLOGICAL EXAMINATION WITHOUT ABNORMAL FINDING: Primary | ICD-10-CM

## 2017-06-13 DIAGNOSIS — Z30.430 ENCOUNTER FOR IUD INSERTION: ICD-10-CM

## 2017-06-13 DIAGNOSIS — Z30.432 ENCOUNTER FOR IUD REMOVAL: ICD-10-CM

## 2017-06-13 PROCEDURE — 99212 OFFICE O/P EST SF 10 MIN: CPT | Mod: 25 | Performed by: FAMILY MEDICINE

## 2017-06-13 PROCEDURE — G0476 HPV COMBO ASSAY CA SCREEN: HCPCS | Performed by: FAMILY MEDICINE

## 2017-06-13 PROCEDURE — 58300 INSERT INTRAUTERINE DEVICE: CPT | Performed by: FAMILY MEDICINE

## 2017-06-13 PROCEDURE — G0145 SCR C/V CYTO,THINLAYER,RESCR: HCPCS | Performed by: FAMILY MEDICINE

## 2017-06-13 PROCEDURE — 58301 REMOVE INTRAUTERINE DEVICE: CPT | Performed by: FAMILY MEDICINE

## 2017-06-13 RX ORDER — ALPRAZOLAM 0.5 MG
0.5 TABLET ORAL DAILY PRN
Qty: 10 TABLET | Refills: 0 | Status: SHIPPED | OUTPATIENT
Start: 2017-06-13 | End: 2017-07-27

## 2017-06-13 ASSESSMENT — ANXIETY QUESTIONNAIRES
6. BECOMING EASILY ANNOYED OR IRRITABLE: SEVERAL DAYS
2. NOT BEING ABLE TO STOP OR CONTROL WORRYING: SEVERAL DAYS
GAD7 TOTAL SCORE: 7
7. FEELING AFRAID AS IF SOMETHING AWFUL MIGHT HAPPEN: SEVERAL DAYS
IF YOU CHECKED OFF ANY PROBLEMS ON THIS QUESTIONNAIRE, HOW DIFFICULT HAVE THESE PROBLEMS MADE IT FOR YOU TO DO YOUR WORK, TAKE CARE OF THINGS AT HOME, OR GET ALONG WITH OTHER PEOPLE: SOMEWHAT DIFFICULT
5. BEING SO RESTLESS THAT IT IS HARD TO SIT STILL: SEVERAL DAYS
3. WORRYING TOO MUCH ABOUT DIFFERENT THINGS: SEVERAL DAYS
1. FEELING NERVOUS, ANXIOUS, OR ON EDGE: SEVERAL DAYS

## 2017-06-13 ASSESSMENT — PATIENT HEALTH QUESTIONNAIRE - PHQ9: 5. POOR APPETITE OR OVEREATING: SEVERAL DAYS

## 2017-06-13 NOTE — PATIENT INSTRUCTIONS
IUD AFTERCARE INSTRUCTIONS   1. Uterine cramping is common after IUD placement. You can help relieve the discomfort with heating pads, Tylenol (acetaminophen), Aspirin or Advil (ibuprofen). If your cramping becomes very painful, please call the clinic.   2. Irregular bleeding and spotting is normal for the first few months after the IUD is placed. In some cases, women may experience irregular bleeding or spotting for up to six months after the IUD is placed. This bleeding can be annoying at first but usually will become lighter with the Mirena IUD quickly. Call the clinic if your bleeding is excessive and not getting better.   3. Your period will likely be shorter and lighter with a Mirena IUD. Approximately 40% of women will stop having periods altogether with the Mirena IUD.   4. IUDs do not protect against sexually transmitted infections  5. The Mirena IUD is effective immediately if it was inserted within seven days after the start of your period. If you have Mirena inserted at any other time during your menstrual cycle, use another method of birth control, like condoms for at least 7 days.   6. It is possible for the IUD to come out of the uterus. If it does slip out of place, it is most likely to happen in the first few months after being put in. To make sure your IUD is in place, you can feel for the IUD strings between periods.  7. Keep your follow-up appointment for 4-6 weeks after the IUD has been placed.  8. Pregnancy is unlikely after IUD placement, but can happen. If you have early pregnancy symptoms like nausea and vomiting, breast tenderness, frequent urination or abdominal pain, you can take a pregnancy test. Please call the clinic if you have any concerns or if your pregnancy test is positive.   9.  The Mirena IUD should be removed and/or replaced after 5 years.       Warning Signs   Call the clinic if any of the following occurs:     Severe abdominal pain or cramping     Unusual bleeding      Fever or chills     Foul smelling vaginal discharge     Painful intercourse     Positive pregnancy test.

## 2017-06-13 NOTE — LETTER
June 27, 2017    Citlali Hawley  [address]   [place] [facility] [facility] [address]    Dear Citlali,  We are happy to inform you that your PAP smear result from 06/13/17 is normal.  We are now able to do a follow up test on PAP smears. The DNA test is for HPV (Human Papilloma Virus). Cervical cancer is closely linked with certain types of HPV. Your result showed no evidence of high risk HPV.  Therefore we recommend you return in 1 year for your next pap smear and HPV test.  You will also need to return to the clinic every year for an annual exam and other preventive tests.  Please contact the clinic at [phone] with any questions.  Sincerely,    Tova Gustafson MD/[facility]

## 2017-06-13 NOTE — PROGRESS NOTES
SUBJECTIVE:                                                    Citlali Hawley is a 32 year old female who presents to clinic today for the following health issues:      Requesting IUD removed and replaced    Patient had IUD placed 5/2012. Would like old one removed and replaced today.     Also has a history of anxiety and would like refill of xanax.   Not due to see psychiatrist for another few weeks.         Problem list and histories reviewed & adjusted, as indicated.  Additional history: as documented    Patient Active Problem List   Diagnosis     Panic disorder     CARDIOVASCULAR SCREENING; LDL GOAL LESS THAN 160     XIANG III (cervical intraepithelial neoplasia III)     Mild recurrent major depression (H)     Mirena IUD insertion     Smoker     XIANG II (cervical intraepithelial neoplasia II)     Mild major depression (H)     Attention deficit disorder     Purging     Eating disorder     Health Care Home     BV (bacterial vaginosis)     Past Surgical History:   Procedure Laterality Date     COLPOSCOPY CERVIX, LOOP ELECTRODE BIOPSY, COMBINED  3/25/11    XIANG I, II and III     HC LEVONORGESTREL IU 52MG 5 YR N/A 05/16/2012     LAPAROSCOPY       LEEP TX, CERVICAL      4/15/2013     TONSILLECTOMY  1994       Social History   Substance Use Topics     Smoking status: Current Some Day Smoker     Packs/day: 0.50     Years: 10.00     Types: Cigarettes     Smokeless tobacco: Never Used     Alcohol use Yes     Family History   Problem Relation Age of Onset     Hypertension Maternal Grandfather      HEART DISEASE Maternal Grandfather      Depression Mother      Depression Father      Depression Sister      Depression Sister            Reviewed and updated as needed this visit by clinical staff  Tobacco  Allergies       Reviewed and updated as needed this visit by Provider         ROS:  Constitutional, HEENT, cardiovascular, pulmonary, gi , gu, psych systems are negative, except as otherwise noted.    OBJECTIVE:             "                                        /71 (BP Location: Right arm, Patient Position: Chair, Cuff Size: Adult Regular)  Pulse 105  Temp 97.7  F (36.5  C) (Oral)  Resp 16  Ht 5' 7.5\" (1.715 m)  Wt 137 lb (62.1 kg)  Breastfeeding? Unknown  BMI 21.14 kg/m2  Body mass index is 21.14 kg/(m^2).  GENERAL: healthy, alert and no distress  RESP: lungs clear to auscultation - no rales, rhonchi or wheezes  CV: regular rate and rhythm, normal S1 S2, no S3 or S4, no murmur, click or rub, no peripheral edema and peripheral pulses strong  ABDOMEN: soft, nontender, no hepatosplenomegaly, no masses and bowel sounds normal   (female): normal female external genitalia, normal urethral meatus , vaginal mucosa pink, moist, well rugated, normal cervix, adnexae, and uterus without masses. and IUD in place   PSYCH: mentation appears normal, affect normal/bright    Diagnostic Test Results:  none      Procedure Note:    Intrauterine Device (IUD) Removal Note  Patient presents today for IUD removal and re-insertion.   The IUD was placed at [facility]- 5/2012 . The IUD type is mirena. She is not up  to date on her pap smear screening. . She does nothave abdominal pain, fevers, dysuria, nor dyspareunia.  Patient's last menstrual period was years ago.   She would like to have another IUD inserted: Yes.  Procedure Note:  A consent form was signed prior to the removal and is to be scanned into the record.  She appears well, in no apparent distress. Alert, pleasant and cooperative. Vital signs are as documented  in vital signs section.    Site (location and laterality): Intrauterine - per vagina  Pelvic exam: uterus midline. Cervix midline. No cervical motion tenderness. No adnexal tenderness. No  cervicitis.  Speculum placed. The IUD strings are seen at external os and grasped with sterile ring  forceps and removed without difficulty. Patient tolerated the procedure well.     IUD PLACEMENT   PRE- procedure DIAGNOSIS: desired " long-term, reversible contraception   PROCEDURE: IUD placement   IUD type: Mirena  PROCEDURE:   Timeout procedure was performed to ensure right patient and right site. A bimanual exam was performed to determine the position of the uterus. The speculum was placed. The vagina and cervix was sterilized in the usual manner and sterile technique was maintained throughout the course of the procedure. A single toothed tenaculum was applied to the anterior lip of the cervix and gentle traction applied to straighten and stabilize it. The depth of the uterus was sounded to be of appropriate depth of 8 cm.  With gentle traction on the tenaculum, the IUD was inserted to the appropriate depth and deposited by moving the slider down to the marine, advancing the  to the fundus, and then moving the slider all the way down. The string was cut to an estimated 4 cm length. Bleeding was minimal. The patient tolerated the procedure well.   Followup: The patient tolerated the procedure well without complications. Standard post-procedure care is explained and return precautions are given.    ASSESSMENT/PLAN:                                                        1. Encounter for gynecological examination without abnormal finding  - pap done today. Supposed to have pap yearly due to XIANG II/III history. Advised of this.   - Pap imaged thin layer screen with HPV - recommended age 30 - 65 years (select HPV order below)  - HPV High Risk Types DNA Cervical    2. Encounter for IUD removal  - REMOVE INTRAUTERINE DEVICE    3. Encounter for IUD insertion  - tolerated procedure well   - HC LEVONORGESTREL IU 52MG 5 YR  - levonorgestrel (MIRENA) 20 MCG/24HR IUD; 1 each (20 mcg) by Intrauterine route continuous NDC=50419-423-01  - INSERTION INTRAUTERINE DEVICE    4. Depression with anxiety  - will refill one time only. Advised she needs to follow up with psych   - ALPRAZolam (XANAX) 0.5 MG tablet; Take 1 tablet (0.5 mg) by mouth daily as needed  for anxiety  Dispense: 10 tablet; Refill: 0    See Patient Instructions    Tova Gustafson MD  [facility]

## 2017-06-13 NOTE — NURSING NOTE
"Chief Complaint   Patient presents with     IUD     pt requesting to have IUD removed and replaced       Initial /71 (BP Location: Right arm, Patient Position: Chair, Cuff Size: Adult Regular)  Pulse 105  Temp 97.7  F (36.5  C) (Oral)  Resp 16  Ht 5' 7.5\" (1.715 m)  Wt 137 lb (62.1 kg)  Breastfeeding? Unknown  BMI 21.14 kg/m2 Estimated body mass index is 21.14 kg/(m^2) as calculated from the following:    Height as of this encounter: 5' 7.5\" (1.715 m).    Weight as of this encounter: 137 lb (62.1 kg).  Medication Reconciliation: complete    Radha Swenson/LEON  [place]---[facility]      "

## 2017-06-13 NOTE — MR AVS SNAPSHOT
After Visit Summary   6/13/2017    Citlali Hawley    MRN: [id number]           Patient Information     Date Of Birth          1985        Visit Information        Provider Department      6/13/2017 2:00 PM Tova Gustafson MD [facility]        Today's Diagnoses     Encounter for gynecological examination without abnormal finding    -  1    Encounter for IUD removal        Encounter for IUD insertion        Depression with anxiety          Care Instructions       IUD AFTERCARE INSTRUCTIONS   1. Uterine cramping is common after IUD placement. You can help relieve the discomfort with heating pads, Tylenol (acetaminophen), Aspirin or Advil (ibuprofen). If your cramping becomes very painful, please call the clinic.   2. Irregular bleeding and spotting is normal for the first few months after the IUD is placed. In some cases, women may experience irregular bleeding or spotting for up to six months after the IUD is placed. This bleeding can be annoying at first but usually will become lighter with the Mirena IUD quickly. Call the clinic if your bleeding is excessive and not getting better.   3. Your period will likely be shorter and lighter with a Mirena IUD. Approximately 40% of women will stop having periods altogether with the Mirena IUD.   4. IUDs do not protect against sexually transmitted infections  5. The Mirena IUD is effective immediately if it was inserted within seven days after the start of your period. If you have Mirena inserted at any other time during your menstrual cycle, use another method of birth control, like condoms for at least 7 days.   6. It is possible for the IUD to come out of the uterus. If it does slip out of place, it is most likely to happen in the first few months after being put in. To make sure your IUD is in place, you can feel for the IUD strings between periods.  7. Keep your follow-up appointment for 4-6 weeks after the IUD has  been placed.  8. Pregnancy is unlikely after IUD placement, but can happen. If you have early pregnancy symptoms like nausea and vomiting, breast tenderness, frequent urination or abdominal pain, you can take a pregnancy test. Please call the clinic if you have any concerns or if your pregnancy test is positive.   9.  The Mirena IUD should be removed and/or replaced after 5 years.       Warning Signs   Call the clinic if any of the following occurs:     Severe abdominal pain or cramping     Unusual bleeding     Fever or chills     Foul smelling vaginal discharge     Painful intercourse     Positive pregnancy test.               Follow-ups after your visit        Your next 10 appointments already scheduled     Jun 27, 2017 12:00 PM CDT   Office Visit with Serina David MD   [facility]    [address]  [address]  [facility] [address] [phone]           Bring a current list of meds and any records pertaining to this visit.  For Physicals, please bring immunization records and any forms needing to be filled out.  Please arrive 10 minutes early to complete paperwork.              Who to contact     If you have questions or need follow up information about today's clinic visit or your schedule please contact [facility] directly at [phone].  Normal or non-critical lab and imaging results will be communicated to you by MyChart, letter or phone within 4 business days after the clinic has received the results. If you do not hear from us within 7 days, please contact the clinic through [employer] or phone. If you have a critical or abnormal lab result, we will notify you by phone as soon as possible.  Submit refill requests through [employer] or call your pharmacy and they will forward the refill request to us. Please allow 3 business days for your refill to be completed.          Additional Information About Your Visit        [facility]  "Information     [employer] lets you send messages to your doctor, view your test results, renew your prescriptions, schedule appointments and more. To sign up, go to [email or web address] . Click on \"Log in\" on the left side of the screen, which will take you to the Welcome page. Then click on \"Sign up Now\" on the right side of the page.     You will be asked to enter the access code listed below, as well as some personal information. Please follow the directions to create your username and password.     Your access code is: [email or web address]  Expires: 2017 10:34 PM     Your access code will  in 90 days. If you need help or a new code, please call your [place] clinic or [phone].        Care EveryWhere ID     This is your [facility] EveryWhere ID. This could be used by other organizations to access your [place] medical records  [id number]        Your Vitals Were     Pulse Temperature Respirations Height Breastfeeding? BMI (Body Mass Index)    105 97.7  F (36.5  C) (Oral) 16 5' 7.5\" (1.715 m) Unknown 21.14 kg/m2       Blood Pressure from Last 3 Encounters:   17 110/71   17 116/70   17 112/68    Weight from Last 3 Encounters:   17 137 lb (62.1 kg)   17 136 lb (61.7 kg)   16 142 lb 4.8 oz (64.5 kg)              We Performed the Following     HPV High Risk Types DNA Cervical     Pap imaged thin layer screen with HPV - recommended age 30 - 65 years (select HPV order below)          Where to get your medicines      Some of these will need a paper prescription and others can be bought over the counter.  Ask your nurse if you have questions.     Bring a paper prescription for each of these medications     ALPRAZolam 0.5 MG tablet          Primary Care Provider Office Phone # Fax #    Serina David [phone]       [facility] [address] [place] [address] [address] [place] [address]        Thank you!     Thank you for choosing [facility]" for your care. Our goal is always to provide you with excellent care. Hearing back from our patients is one way we can continue to improve our services. Please take a few minutes to complete the written survey that you may receive in the mail after your visit with us. Thank you!             Your Updated Medication List - Protect others around you: Learn how to safely use, store and throw away your medicines at www.disposemymeds.org.          This list is accurate as of: 6/13/17  3:16 PM.  Always use your most recent med list.                   Brand Name Dispense Instructions for use    ALPRAZolam 0.5 MG tablet    XANAX    10 tablet    Take 1 tablet (0.5 mg) by mouth daily as needed for anxiety       amphetamine-dextroamphetamine 20 MG per tablet    ADDERALL    120 tablet    Take 2 tablets (40 mg) by mouth 2 times daily In the afternoon. Patient needs generic brand with BARR        FETZIMA 40 MG 24 hr capsule   Generic drug:  levomilnacipran      TK 1 C PO D       hydrocortisone 2.5 % cream    ANUSOL-HC    15 g    Place rectally 2 times daily       lamoTRIgine 25 MG tablet    LaMICtal         levonorgestrel 20 MCG/24HR IUD    MIRENA    1 each    1 each by Intrauterine route once.

## 2017-06-14 ASSESSMENT — PATIENT HEALTH QUESTIONNAIRE - PHQ9: SUM OF ALL RESPONSES TO PHQ QUESTIONS 1-9: 3

## 2017-06-14 ASSESSMENT — ANXIETY QUESTIONNAIRES: GAD7 TOTAL SCORE: 7

## 2017-06-15 LAB
COPATH REPORT: NORMAL
PAP: NORMAL

## 2017-06-16 LAB
FINAL DIAGNOSIS: NORMAL
HPV HR 12 DNA CVX QL NAA+PROBE: NEGATIVE
HPV16 DNA SPEC QL NAA+PROBE: NEGATIVE
HPV18 DNA SPEC QL NAA+PROBE: NEGATIVE
SPECIMEN DESCRIPTION: NORMAL

## 2017-06-27 ENCOUNTER — OFFICE VISIT (OUTPATIENT)
Dept: PEDIATRICS | Facility: CLINIC | Age: 32
End: 2017-06-27
Payer: COMMERCIAL

## 2017-06-27 ENCOUNTER — TELEPHONE (OUTPATIENT)
Dept: PEDIATRICS | Facility: CLINIC | Age: 32
End: 2017-06-27

## 2017-06-27 VITALS
HEART RATE: 92 BPM | OXYGEN SATURATION: 100 % | WEIGHT: 137 LBS | SYSTOLIC BLOOD PRESSURE: 116 MMHG | HEIGHT: 68 IN | BODY MASS INDEX: 20.76 KG/M2 | DIASTOLIC BLOOD PRESSURE: 70 MMHG | TEMPERATURE: 97.6 F

## 2017-06-27 DIAGNOSIS — F98.8 ADD (ATTENTION DEFICIT DISORDER) WITHOUT HYPERACTIVITY: Primary | ICD-10-CM

## 2017-06-27 DIAGNOSIS — F50.9 EATING DISORDER: ICD-10-CM

## 2017-06-27 DIAGNOSIS — F33.0 MILD RECURRENT MAJOR DEPRESSION (H): ICD-10-CM

## 2017-06-27 DIAGNOSIS — E87.6 HYPOKALEMIA: ICD-10-CM

## 2017-06-27 DIAGNOSIS — F41.0 PANIC DISORDER: ICD-10-CM

## 2017-06-27 LAB
ANION GAP SERPL CALCULATED.3IONS-SCNC: 9 MMOL/L (ref 3–14)
BUN SERPL-MCNC: 15 MG/DL (ref 7–30)
CALCIUM SERPL-MCNC: 9 MG/DL (ref 8.5–10.1)
CHLORIDE SERPL-SCNC: 90 MMOL/L (ref 94–109)
CO2 SERPL-SCNC: 36 MMOL/L (ref 20–32)
CREAT SERPL-MCNC: 0.93 MG/DL (ref 0.52–1.04)
GFR SERPL CREATININE-BSD FRML MDRD: 69 ML/MIN/1.7M2
GLUCOSE SERPL-MCNC: 113 MG/DL (ref 70–99)
POTASSIUM SERPL-SCNC: 2.6 MMOL/L (ref 3.4–5.3)
SODIUM SERPL-SCNC: 135 MMOL/L (ref 133–144)

## 2017-06-27 PROCEDURE — 99214 OFFICE O/P EST MOD 30 MIN: CPT | Performed by: PEDIATRICS

## 2017-06-27 PROCEDURE — 36415 COLL VENOUS BLD VENIPUNCTURE: CPT | Performed by: PEDIATRICS

## 2017-06-27 PROCEDURE — 80048 BASIC METABOLIC PNL TOTAL CA: CPT | Performed by: PEDIATRICS

## 2017-06-27 RX ORDER — DEXTROAMPHETAMINE SACCHARATE, AMPHETAMINE ASPARTATE, DEXTROAMPHETAMINE SULFATE AND AMPHETAMINE SULFATE 5; 5; 5; 5 MG/1; MG/1; MG/1; MG/1
40 TABLET ORAL 2 TIMES DAILY
Qty: 120 TABLET | Refills: 0 | Status: SHIPPED | OUTPATIENT
Start: 2017-06-27 | End: 2017-07-27

## 2017-06-27 NOTE — MR AVS SNAPSHOT
"              After Visit Summary   6/27/2017    Citlali Hawley    MRN: [id number]           Patient Information     Date Of Birth          1985        Visit Information        Provider Department      6/27/2017 12:00 PM Serina David MD [facility]        Today's Diagnoses     Hypokalemia    -  1    ADD (attention deficit disorder) without hyperactivity          Care Instructions    Stop for labs on your way out    Keep your follow up with Nadine Matthews next month    Labs today to follow your potassium    Medication refilled today          Follow-ups after your visit        Your next 10 appointments already scheduled     Jul 27, 2017  5:15 PM CDT   New Visit with Nadine Matthews NP   [facility]    [address]  [address]  [facility] [address] [phone]              Who to contact     If you have questions or need follow up information about today's clinic visit or your schedule please contact [facility] directly at [phone].  Normal or non-critical lab and imaging results will be communicated to you by [employer]hart, letter or phone within 4 business days after the clinic has received the results. If you do not hear from us within 7 days, please contact the clinic through HuJe labst or phone. If you have a critical or abnormal lab result, we will notify you by phone as soon as possible.  Submit refill requests through [employer] or call your pharmacy and they will forward the refill request to us. Please allow 3 business days for your refill to be completed.          Additional Information About Your Visit        [employer] Information     [employer] lets you send messages to your doctor, view your test results, renew your prescriptions, schedule appointments and more. To sign up, go to [email or web address] . Click on \"Log in\" on the left side of the screen, which will take you to the Welcome page. Then click on \"Sign " "up Now\" on the right side of the page.     You will be asked to enter the access code listed below, as well as some personal information. Please follow the directions to create your username and password.     Your access code is: [email or web address]  Expires: 2017 10:34 PM     Your access code will  in 90 days. If you need help or a new code, please call your [place] clinic or [phone].        Care EveryWhere ID     This is your Care EveryWhere ID. This could be used by other organizations to access your [place] medical records  [id number]        Your Vitals Were     Pulse Temperature Height Pulse Oximetry BMI (Body Mass Index)       92 97.6  F (36.4  C) (Tympanic) 5' 7.5\" (1.715 m) 100% 21.14 kg/m2        Blood Pressure from Last 3 Encounters:   17 116/70   17 110/71   17 116/70    Weight from Last 3 Encounters:   17 137 lb (62.1 kg)   17 137 lb (62.1 kg)   17 136 lb (61.7 kg)              We Performed the Following     Basic metabolic panel          Where to get your medicines      Some of these will need a paper prescription and others can be bought over the counter.  Ask your nurse if you have questions.     Bring a paper prescription for each of these medications     amphetamine-dextroamphetamine 20 MG per tablet          Primary Care Provider Office Phone # Fax #    Serina David [phone]       [place] [facility] [address] [place] [address]        Equal Access to Services     [facility]: Hadii kala benavides Soandrea, waaxda luqadaha, qaybta kaalmaaston quick . So [facility] [phone].    ATENCIÓN: Si habla español, tiene a mccormick disposición servicios gratuitos de asistencia lingüística. Llame al [phone].    We comply with applicable federal civil rights laws and [place] laws. We do not discriminate on the basis of race, color, national origin, age, disability sex, sexual " orientation or gender identity.            Thank you!     Thank you for choosing [facility]  for your care. Our goal is always to provide you with excellent care. Hearing back from our patients is one way we can continue to improve our services. Please take a few minutes to complete the written survey that you may receive in the mail after your visit with us. Thank you!             Your Updated Medication List - Protect others around you: Learn how to safely use, store and throw away your medicines at www.disposemymeds.org.          This list is accurate as of: 6/27/17 12:27 PM.  Always use your most recent med list.                   Brand Name Dispense Instructions for use Diagnosis    ALPRAZolam 0.5 MG tablet    XANAX    10 tablet    Take 1 tablet (0.5 mg) by mouth daily as needed for anxiety    Depression with anxiety       amphetamine-dextroamphetamine 20 MG per tablet    ADDERALL    120 tablet    Take 2 tablets (40 mg) by mouth 2 times daily In the afternoon. Patient needs generic brand with BARR     ADD (attention deficit disorder) without hyperactivity       FETZIMA 40 MG 24 hr capsule   Generic drug:  levomilnacipran      TK 1 C PO D        lamoTRIgine 25 MG tablet    LaMICtal          levonorgestrel 20 MCG/24HR IUD    MIRENA     1 each (20 mcg) by Intrauterine route continuous [id number]    Encounter for IUD insertion

## 2017-06-27 NOTE — PATIENT INSTRUCTIONS
Stop for labs on your way out    Keep your follow up with Nadine Matthews next month    Labs today to follow your potassium    Medication refilled today

## 2017-06-27 NOTE — NURSING NOTE
"Chief Complaint   Patient presents with     Recheck Medication       Initial /70 (BP Location: Right arm, Patient Position: Right side, Cuff Size: Adult Regular)  Pulse 92  Temp 97.6  F (36.4  C) (Tympanic)  Ht 5' 7.5\" (1.715 m)  Wt 137 lb (62.1 kg)  SpO2 100%  BMI 21.14 kg/m2 Estimated body mass index is 21.14 kg/(m^2) as calculated from the following:    Height as of this encounter: 5' 7.5\" (1.715 m).    Weight as of this encounter: 137 lb (62.1 kg).  Medication Reconciliation: complete  "

## 2017-06-27 NOTE — PROGRESS NOTES
"  SUBJECTIVE:                                                    Citlali Hawley is a 32 year old female who presents to clinic today for the following health issues:      Medication Followup of amphetamine-dextroamphetamine (ADDERALL) 20 MG per tablet    Taking Medication as prescribed: yes    Side Effects:  None    Medication Helping Symptoms:  yes       Patient reports that she is doing well - transitioning her psychiatric care from [facility] Dumont at [facility] to [facility] in the [place] system.  Currently on Fetzima and Lamictal and reports that she is doing well with these.  Has asked for records to be sent to us from her past provider.   Recently engaged.  Enjoys her job at [employer].    She has a history of an eating disorder, but denies using any purging behaviors or use of medications for weight loss purposes repeatedly to me today.  Reports that she is in a good place with regard to her eating disorder struggles.    Reports that her current dose of adderall has been working well for her at her new job with good concentration and task completion and no side effects noticed.  She does not want to decrease the dose for fear that it will not be effective.    Recent ER visit for anxiety with low potassium noted - needs to be rechecked today.  Patient has potassium supplements at home, but hasn't been taking - getting some care in the [facility] system.      Problem list and histories reviewed & adjusted, as indicated.  Additional history: as documented    Reviewed and updated as needed this visit by clinical staff       Reviewed and updated as needed this visit by Provider         ROS:  Constitutional, cardiovascular, pulmonary, gi and psych, neuro systems are negative, except as otherwise noted.    OBJECTIVE:     /70 (BP Location: Right arm, Patient Position: Right side, Cuff Size: Adult Regular)  Pulse 92  Temp 97.6  F (36.4  C) (Tympanic)  Ht 5' 7.5\" (1.715 m)  Wt " 137 lb (62.1 kg)  SpO2 100%  BMI 21.14 kg/m2  Body mass index is 21.14 kg/(m^2).  GENERAL: healthy, alert and no distress  RESP: lungs clear to auscultation - no rales, rhonchi or wheezes  CV: regular rate and rhythm, normal S1 S2, no S3 or S4, no murmur, click or rub, no peripheral edema and peripheral pulses strong  PSYCH: mentation appears normal, affect normal/bright    Diagnostic Test Results:  Results for orders placed or performed in visit on 06/27/17 (from the past 24 hour(s))   Basic metabolic panel   Result Value Ref Range    Sodium 135 133 - 144 mmol/L    Potassium 2.6 (LL) 3.4 - 5.3 mmol/L    Chloride 90 (L) 94 - 109 mmol/L    Carbon Dioxide 36 (H) 20 - 32 mmol/L    Anion Gap 9 3 - 14 mmol/L    Glucose 113 (H) 70 - 99 mg/dL    Urea Nitrogen 15 7 - 30 mg/dL    Creatinine 0.93 0.52 - 1.04 mg/dL    GFR Estimate 69 >60 mL/min/1.7m2    GFR Estimate If Black 84 >60 mL/min/1.7m2    Calcium 9.0 8.5 - 10.1 mg/dL       ASSESSMENT/PLAN:         ICD-10-CM    1. ADD (attention deficit disorder) without hyperactivity F98.8 amphetamine-dextroamphetamine (ADDERALL) 20 MG per tablet    Patient reports that this is treating her symptoms well and denies any side effects.  Discussed that it would be safest for all of her psychiatric mediations be prescribed by one person and she will discuss when she establishes care with new psychiatric nurse practitioner later this month.  Agreed to refill until next visit at time of our appointment - new script given.  Patient denies using adderall for weight loss purposes.    Labs returned after her departure from clinic showing significant hypokalemic hypochloremic metabolic alkalosis very concerning for purging behaviors.  Recommended follow up in clinic, repeat labs, potassium supplementation.       Given concern for arrhythmia, I will not refill this medication again until this issue is resolved.       2. Hypokalemia E87.6 Basic metabolic panel     potassium chloride SA (POTASSIUM  CHLORIDE) 20 MEQ CR tablet     Basic metabolic panel    See on-call note from Dr Patel - labs returned after our visit showing findings as described above - concerning for purging behaviors despite patient denying this at our visit.   3. Eating disorder F50.9 See above - patient has visit with Nadine Matthews later this month.  Would likely also benefit from additional eating disorder treatment, but she was adamant with me at visit that this was not an issue.   4. Panic disorder F41.0 Medications being prescribed by psychiatry   5. Mild recurrent major depression (H) F33.0 Medications being prescribed by psychiatry     Serina David MD  [facility]

## 2017-06-28 ENCOUNTER — TELEPHONE (OUTPATIENT)
Dept: PEDIATRICS | Facility: CLINIC | Age: 32
End: 2017-06-28

## 2017-06-28 ENCOUNTER — TRANSFERRED RECORDS (OUTPATIENT)
Dept: HEALTH INFORMATION MANAGEMENT | Facility: CLINIC | Age: 32
End: 2017-06-28

## 2017-06-28 LAB
CREAT SERPL-MCNC: 0.7 MG/DL (ref 0.5–1.5)
GLUCOSE SERPL-MCNC: 111 MG/DL (ref 60–115)
POTASSIUM SERPL-SCNC: 3.4 MMOL/L (ref 3.6–5.1)

## 2017-06-28 RX ORDER — POTASSIUM CHLORIDE 1500 MG/1
20 TABLET, EXTENDED RELEASE ORAL 2 TIMES DAILY
Qty: 60 TABLET | Refills: 1 | Status: SHIPPED | OUTPATIENT
Start: 2017-06-28 | End: 2017-10-27

## 2017-06-28 NOTE — TELEPHONE ENCOUNTER
Call from  [facility]: on call note    Potassium level 2.6.  I spoke with Citlali. She states she is feeling fine. She does have potassium at home but is not sure about the dose. I have recommended that she go in to the ER due to the potential for cardiac arrhythmia with potassium at this level. Most recently 1 month ago was 2.9. Has been low intermittently over the last few years.  I did recommend she make a follow up appointment with Dr. David to discuss further management options.  Maryann Patel M.D.

## 2017-06-28 NOTE — TELEPHONE ENCOUNTER
PA info bellow it had a pop up that said they may send more questions back. I will keep checking. But if Im not in clinic please answer the questions when they send them. The PA info is bellow to go into that PA and you would find the questions there.    Citlali Hawley (Key: [id number])  Adderall 20MG tablets  Status: Question Request  Created: June 28th, 2017      Wen Nichole MA

## 2017-06-28 NOTE — TELEPHONE ENCOUNTER
Patient calling that she has switched insurance to  Care and that she needs a PA for the Adderall to get reimbursed for paying out of pocket. Please initiate PA.   Fartun Crisostomo RN

## 2017-06-30 NOTE — TELEPHONE ENCOUNTER
Medication approved starting 06/28/2017 through 06/29/2018. Pharmacy notiifed.  Anne GRIFFITHS, LEON,AAMA

## 2017-07-13 ENCOUNTER — TELEPHONE (OUTPATIENT)
Dept: PEDIATRICS | Facility: CLINIC | Age: 32
End: 2017-07-13

## 2017-07-13 NOTE — LETTER
2017        RE: Citlali Hawley  : 1985  [address]   [place] [facility] [address]        To Whom It May Concern,      Citlali Hawley is under my professional care. Please excuse her from her work absences on 17 & 17 due to illness.  She may return to work full time without restrictions on her next scheduled day.      Sincerely,              Serina David MD

## 2017-07-13 NOTE — TELEPHONE ENCOUNTER
Adelaide with Dr. David - Ok for work letter. Let's get [place] discharge notes, I will review and follow up with patient on follow up plan.     Composed letter, faxed and updated patient.     Vinicio - can you please call [facility] and get IP notes from 6/28/17 visit?

## 2017-07-13 NOTE — TELEPHONE ENCOUNTER
Records received. Placed in Dr. David's [facility].    Thanks  Vinicio TALLEY  Team Coodinator

## 2017-07-13 NOTE — TELEPHONE ENCOUNTER
Called spoke to Medical records will fax over to [facility] fax.    Thanks  Vinicio TALLEY  Team Coodinator

## 2017-07-13 NOTE — TELEPHONE ENCOUNTER
Patient calling to request a work note excusing her from work 7/12 & 7/13, she was admitted [facility] about 6/28/17 for 2 days for low potassium, has been fatigued the last 2 days & didn't go into work.  Is taking Potassium 20 mEq bid. Has tried to get in to see Dr. David for follow up, but hasn't found an appointment.     Will huddle with Dr. David. Would you like patient to have follow up appointment?    If approved, fax letter to: [phone] Attn: Elana

## 2017-07-19 NOTE — TELEPHONE ENCOUNTER
Patient called back. Looked through the schedule and to match with her schedule she would be booking into mid to late August. Wanted to check with Dr. David regarding this. Spoke with Dr. David and she said that would be fine or if she wanted to have a partner see her that would be fine as well. She would need the potassium rechecked prior to the end of August and she could either get that done in [place] or here.   Left message on answering machine for patient to call back.  Fartun Crisostomo RN

## 2017-07-27 ENCOUNTER — OFFICE VISIT (OUTPATIENT)
Dept: PSYCHIATRY | Facility: CLINIC | Age: 32
End: 2017-07-27
Payer: COMMERCIAL

## 2017-07-27 VITALS
DIASTOLIC BLOOD PRESSURE: 60 MMHG | BODY MASS INDEX: 20.61 KG/M2 | OXYGEN SATURATION: 98 % | HEIGHT: 68 IN | TEMPERATURE: 98.6 F | WEIGHT: 136 LBS | HEART RATE: 114 BPM | SYSTOLIC BLOOD PRESSURE: 98 MMHG

## 2017-07-27 DIAGNOSIS — F90.0 ATTENTION DEFICIT HYPERACTIVITY DISORDER (ADHD), PREDOMINANTLY INATTENTIVE TYPE: Primary | ICD-10-CM

## 2017-07-27 DIAGNOSIS — F39 MOOD DISORDER (H): ICD-10-CM

## 2017-07-27 DIAGNOSIS — F98.8 ADD (ATTENTION DEFICIT DISORDER) WITHOUT HYPERACTIVITY: ICD-10-CM

## 2017-07-27 PROCEDURE — 90792 PSYCH DIAG EVAL W/MED SRVCS: CPT | Performed by: NURSE PRACTITIONER

## 2017-07-27 RX ORDER — LORAZEPAM 1 MG/1
1 TABLET ORAL EVERY 6 HOURS PRN
COMMUNITY
End: 2017-07-27

## 2017-07-27 RX ORDER — DEXTROAMPHETAMINE SACCHARATE, AMPHETAMINE ASPARTATE, DEXTROAMPHETAMINE SULFATE AND AMPHETAMINE SULFATE 5; 5; 5; 5 MG/1; MG/1; MG/1; MG/1
TABLET ORAL
Qty: 90 TABLET | Refills: 0 | Status: SHIPPED | OUTPATIENT
Start: 2017-07-27 | End: 2018-01-01

## 2017-07-27 RX ORDER — LEVOMILNACIPRAN HYDROCHLORIDE 40 MG/1
80 CAPSULE, EXTENDED RELEASE ORAL DAILY
Qty: 60 CAPSULE | Refills: 1 | Status: SHIPPED | OUTPATIENT
Start: 2017-07-27

## 2017-07-27 RX ORDER — LAMOTRIGINE 25 MG/1
75 TABLET ORAL DAILY
Qty: 90 TABLET | Refills: 1 | Status: SHIPPED | OUTPATIENT
Start: 2017-07-27 | End: 2018-01-01

## 2017-07-27 RX ORDER — LORAZEPAM 1 MG/1
1 TABLET ORAL EVERY 6 HOURS PRN
Qty: 5 TABLET | Refills: 0 | Status: SHIPPED | OUTPATIENT
Start: 2017-07-27 | End: 2018-01-01

## 2017-07-27 ASSESSMENT — ANXIETY QUESTIONNAIRES
2. NOT BEING ABLE TO STOP OR CONTROL WORRYING: SEVERAL DAYS
7. FEELING AFRAID AS IF SOMETHING AWFUL MIGHT HAPPEN: SEVERAL DAYS
1. FEELING NERVOUS, ANXIOUS, OR ON EDGE: SEVERAL DAYS
6. BECOMING EASILY ANNOYED OR IRRITABLE: NOT AT ALL
5. BEING SO RESTLESS THAT IT IS HARD TO SIT STILL: NOT AT ALL
GAD7 TOTAL SCORE: 4
3. WORRYING TOO MUCH ABOUT DIFFERENT THINGS: SEVERAL DAYS
IF YOU CHECKED OFF ANY PROBLEMS ON THIS QUESTIONNAIRE, HOW DIFFICULT HAVE THESE PROBLEMS MADE IT FOR YOU TO DO YOUR WORK, TAKE CARE OF THINGS AT HOME, OR GET ALONG WITH OTHER PEOPLE: SOMEWHAT DIFFICULT

## 2017-07-27 ASSESSMENT — PATIENT HEALTH QUESTIONNAIRE - PHQ9: 5. POOR APPETITE OR OVEREATING: NOT AT ALL

## 2017-07-27 NOTE — NURSING NOTE
"Chief Complaint   Patient presents with     Consult     Referred by Dr David- medication review       Initial BP 98/60 (BP Location: Right arm, Patient Position: Chair, Cuff Size: Adult Regular)  Pulse 114  Temp 98.6  F (37  C) (Oral)  Ht 5' 7.5\" (1.715 m)  Wt 136 lb (61.7 kg)  SpO2 98%  BMI 20.99 kg/m2 Estimated body mass index is 20.99 kg/(m^2) as calculated from the following:    Height as of this encounter: 5' 7.5\" (1.715 m).    Weight as of this encounter: 136 lb (61.7 kg).  Medication Reconciliation: complete    "

## 2017-07-27 NOTE — Clinical Note
Warren David Thank you for the Psychiatry referral to the [facility] ([facility]). Our psychiatry providers act as a specialty service for Primary Care Providers in the [facility] that seek to optimize medications for unstable patients.  Once medications have been optimized, our providers discharge the patient back to the referring Primary Care Provider for ongoing medication management.  This type of system allows our providers to serve a high volume of patients.   Please see my Impression and Plan.  If you have any questions or concerns, please let me know.  Nadine

## 2017-07-27 NOTE — PATIENT INSTRUCTIONS
Treatment Plan:    Continue Ativan (lorazepam) 1 mg by mouth as needed for panic.     Continue Fetzima (levomilnacipran) 80 mg by mouth daily.     Continue Lamictal (lamotrigine) 75 mg by mouth daily.     Reduce Adderall (amphetamine salts) to 60 mg daily.     Continue all other medications as reviewed per electronic medical record today.     All questions addressed. Education and counseling completed regarding risks and benefits of medications and psychotherapy options.    Safety plan was reviewed. To the Emergency Department as needed or call after hours crisis line at [phone] or [phone].     To schedule individual or family therapy, call [facility] at [phone].     Schedule an appointment with me in as needed.  Call [facility] at [phone] to schedule.    Follow up with primary care provider as planned or for acute medical concerns.    Call the psychiatric nurse line with medication questions or concerns at [phone].    My Practice Policy was reviewed and signed: YES     Lalahart may be used to communicate with your provider, but this is not intended to be used for emergencies.

## 2017-07-27 NOTE — PROGRESS NOTES
"                                                         Outpatient Psychiatric Evaluation  Adult    Name:  Citlali Hawley  : 1985    Source of Referral:  Primary Care Provider: Serina David MD - last visit 2017  Current Psychotherapist: None -   No Show with me on 2017    Identifying Data:  Patient is a 32 year old year old, single  White American female  who presents for initial visit with me.  Patient is currently employed part time. Patient attended the session alone. Consent to communicate signed for Adelaida Gonzalez patient's Mother. Consent for treatment signed and included in electronic medical record. Discussed limits of confidentiality today.    Chief Complaint:  Consultation.  Patient reports: \"medication review\"  Patient prefers to be called: \"Citlali\"    HPI:  Patient was established with a long term community psychiatry provider, Celestina, Psychiatry NP at [facility] in [place].   Patient would like to change to a new provider and would not explain why.   Has seen psychiatry in the past. No current counseling.   Sometimes reports panic attacks and takes Ativan (lorazepam) or Xanax (alprazolam) as needed.   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   First diagnosed with depression and Attention Deficit Hyperactivity Disorder (ADHD) and Anxiety all at once around the age of 10 or 11.   Patient reports struggling with hypokalemia for the last few years.   History of DBT programming was helpful.   Past diagnoses include: Depression, ADD, Panic  Current medications include: Adderall (amphetamine salts) 40 mg BID, Lamictal (lamotrigine) 75 mg,  Fetzima 80 mg  Medication side effects: Hypokalemia  Current stressors include: Custody Issues with Daughter age 12  Coping mechanisms and supports include: Family, DBT skills    Psychiatric Review of Symptoms:  Depression: Interest: Decrease    Depressed Mood    Concentration: Decrease   PHQ-9 scores: "   PHQ-9 SCORE 11/15/2016 6/13/2017 7/27/2017   Total Score 16 3 3     Ailin:  No symptoms   MDQ Score: Negative Screen  Anxiety: Feeling nervous, anxious, or on edge    Uncontrolled worrying    Worrying too much about different things    Thoughts of impending doom    JUANITO-7 scores:    JUANITO-7 SCORE 11/15/2016 6/13/2017 7/27/2017   Total Score 21 7 4     Panic:  Shortness of Breath    About once per month occurs  Agoraphobia:  No   PTSD:  No symptoms   OCD:  No symptoms   Psychosis: No symptoms   ADD / ADHD: Attention Problem(s)    Problems with Listening    Task Completion Difficulties    Poor Organization  Gambling or shoplifting: No   Eating Disorder:  Hx in High School of [employer]- no treatment  Sleep:   No symptoms     7-8 hours of hours per night    Psychiatric History:   Hospitalizations: None  History of Commitment? No   Past Treatment: counseling, physician / PCP, medication(s) from physician / PCP and psychiatry  Suicide Attempts: No   Current Suicide Risk:  No  Suicide Assessment Completed Today.  Self-injurious Behavior: Denies  Electroconvulsive Therapy (ECT) or Transcranial Magnetic Stimulation (TMS): No   GeneSight Genetic Testing: Yes - I have no access to these      Past medication trials include but are not limited to:   Lamictal (lamotrigine)  Xanax (alprazolam)  Fetzima (levomilnacipran)   Adderall (amphetamine salts)   Prozac (fluoxetine)  Ativan (lorazepam)   Ritalin (methylphenidate)   Neurontin (gabapentin)   Celexa (citalopram)   Haldol (haloperidol)       Substance Use History:  Current use of drugs or alcohol: Denies - does not drink due to history of negative relationships. Denies Marijuana.   Patient reports no problems as a result of their drinking / drug use.   Based on the clinical interview, there  are not indications of drug or alcohol abuse.  Tobacco use: Yes Cigarettes 1/5 ppd over the last 10 years Ready to quit?  No.  Nicotine Replacement Therapy tried: Cold [place] when pregnant,  "Patches and Gum \"caused more anxiety\"  Caffeine:  Yes  0-1 cups/day of coffee, 0-1 soda/day  Patient has not received chemical dependency treatment in the past  Recovery Programming Involvement: Not Applicable    Past Medical History:  Past Medical History:   Diagnosis Date     ASCUS on Pap smear 4/12,7/12    + HPV undeterimined risk, colpo XIANG I     XIANG 3 - cervical intraepithelial neoplasia grade 3     LEEP 3/24/11     Depression      Eating disorder 2/27/2014     History of colposcopy with cervical biopsy 3/11/11,7/12    XIANG I,II,III     Panic disorder 2/3/2009      Surgery:   Past Surgical History:   Procedure Laterality Date     COLPOSCOPY CERVIX, LOOP ELECTRODE BIOPSY, COMBINED  3/25/11    XIANG I, II and III     HC LEVONORGESTREL IU 52MG 5 YR N/A 05/16/2012     LAPAROSCOPY       LEEP TX, CERVICAL      4/15/2013     TONSILLECTOMY  1994     Allergies:     Allergies   Allergen Reactions     Codeine Sulfate Swelling     Throat swells      Haldol [Haloperidol] Other (See Comments)     Confusion and breathing concerns     Penicillins      Primary Care Provider: Serina David MD  Seizures or Head Injury: No  Diet: No Restrictions  Food Allergies: No   Exercise: gym membership  Supplements: Reviewed per Electronic Medical Record Today    Current Medications:    Current Outpatient Prescriptions:      LORazepam (ATIVAN) 1 MG tablet, Take 1 mg by mouth every 6 hours as needed for anxiety, Disp: , Rfl:      potassium chloride SA (POTASSIUM CHLORIDE) 20 MEQ CR tablet, Take 1 tablet (20 mEq) by mouth 2 times daily, Disp: 60 tablet, Rfl: 1     amphetamine-dextroamphetamine (ADDERALL) 20 MG per tablet, Take 2 tablets (40 mg) by mouth 2 times daily In the afternoon. Patient needs generic brand with BARR , Disp: 120 tablet, Rfl: 0     levonorgestrel (MIRENA) 20 MCG/24HR IUD, 1 each (20 mcg) by Intrauterine route continuous NDC=50419-423-01, Disp: , Rfl:      FETZIMA 40 MG 24 hr capsule, TK 1 C PO D 80 mg per " day, Disp: , Rfl: 1     lamoTRIgine (LAMICTAL) 25 MG tablet, 25 mg daily , Disp: , Rfl: 1    The [place] Prescription Monitoring Program has been reviewed and there are no concerns about diversionary activity for controlled substances at this time.      CAMRON JONES  Search Criteria: Last Name 'camron' and First Name 'daniella' and  =  and Request Period =   to ' - 11 out of 11 Recipients Selected.    Fill Date Product, Str, Form Qty Days Pt ID Prescriber Written RX# N/R* Pharm **MED+  ---------- -------------------------------- ------ ---- --------- ---------- ---------- ------------ ----- --------- ------  2017 DEXTROAMP-AMPHETAMIN 20 MG .00 30 [id number] 2017 [id number] N [id number] 00.0  2017 ALPRAZOLAM 0.5 MG TABLET 10.00 10 [id number] 2017 [id number] N [id number] 00.0  2017 DEXTROAMP-AMPHETAMIN 20 MG .00 30 [id number] 2017 [id number] N [id number] 00.0  2017 LORAZEPAM 1 MG TABLET 3.00 1 [id number] 2017 [id number] N [id number] 00.0  2017 DEXTROAMP-AMPHETAMIN 20 MG .00 30 [id number] 2017 [id number] N [id number] 00.0  2017 DEXTROAMP-AMPHETAMIN 20 MG .00 30 [id number] 2017 [id number] N [id number] 00.0  2017 DEXTROAMP-AMPHETAMIN 20 MG .00 30 [id number] 2017 [id number] N [id number] 00.0  2017 LORAZEPAM 1 MG TABLET 10.00 5 [id number] 2017 [id number] N [id number] 00.0  2017 LORAZEPAM 1 MG TABLET 10.00 5 [id number] 2017 [id number] N [id number] 00.0  02/15/2017 ALPRAZOLAM 0.5 MG TABLET 10.00 10 [id number] 02/15/2017 [id number] N [id number] 00.0  2017 DEXTROAMP-AMPHETAMIN 20 MG .00 30 [id number] 2017 [id number] N [id number] 00.0  2017 HYDROCODON-ACETAMINOPHEN 5-325 10.00 2 [id number] 2017 [id number] N [id number] 25.0  2017 ALPRAZOLAM 0.5 MG TABLET 12.00 12  [id number] 01/12/2017 [id number] N [id number] 00.0  12/30/2016 DEXTROAMP-AMPHETAMIN 20 MG .00 30 [id number] 12/28/2016 [id number] N [id number] 00.0  12/29/2016 ALPRAZOLAM 0.5 MG TABLET 12.00 4 [id number] 12/29/2016 [id number] N [id number] 00.0  12/01/2016 DEXTROAMP-AMPHETAMIN 20 MG .00 30 [id number] 12/01/2016 [id number] N [id number] 00.0  11/15/2016 ALPRAZOLAM 0.5 MG TABLET 20.00 10 [id number] 11/15/2016 [id number] N [id number] 00.0  11/03/2016 DEXTROAMP-AMPHETAMIN 20 MG .00 30 [id number] 11/02/2016 [id number] N [id number] 00.0  10/04/2016 DEXTROAMP-AMPHETAMIN 20 MG .00 30 [id number] 10/04/2016 [id number] N [id number] 00.0  09/05/2016 DEXTROAMP-AMPHETAMIN 20 MG .00 30 [id number] 08/31/2016 [id number] N [id number] 00.0  08/22/2016 HYDROCODON-ACETAMINOPHEN 5-325 30.00 4 [id number] 08/22/2016 [id number] N [id number] 37.5  08/08/2016 DEXTROAMP-AMPHETAMIN 20 MG .00 30 [id number] 08/08/2016 [id number] N [id number] 00.0    *N/R N=New R=Refill  +MED Daily    Prescribers for prescriptions listed  ----------------------------------------------------------------------------------------------------------------------------------  [id number] TORITO LIMON T; [facility], [address] [facility],, [facility] [address]  [id number] ANTHONY ROSS ([facility]); FN, [facility], [address] [facility],, [facility] [address]  [id number] SONIYA NAVARRO; [address] [facility] [address]  [id number] NBA BENDER MD; [address], [facility] [address]  [id number] MARIAH BRUNSON; C/O [facility], [address] [place] [address]  [id number] SAMIA CHAVEZ; [address] [facility], [facility] [address]  [id number] MICHELLE SULLIVAN; [address] [facility] [address]  [id number] EDUARDO LESLIE; [facility], [address] [place] [address]  [id number] TORITO LIMON; [facility], [address]  "[facility],[place] [address]     Vital Signs:  Vitals: BP 98/60 (BP Location: Right arm, Patient Position: Chair, Cuff Size: Adult Regular)  Pulse 114  Temp 98.6  F (37  C) (Oral)  Ht 5' 7.5\" (1.715 m)  Wt 136 lb (61.7 kg)  SpO2 98%  BMI 20.99 kg/m2    Labs:  Most recent laboratory results reviewed and the pertinent results include:   Office Visit on 06/27/2017   Component Date Value Ref Range Status     Sodium 06/27/2017 135  133 - 144 mmol/L Final     Potassium 06/27/2017 2.6* 3.4 - 5.3 mmol/L Final    Comment: Critical Value called to and read back by  COMFORT CASTRO 1928   Critical Value called to and read back by  DR SONJA GARNER ON [id number] @ 2035 BY [place]       Chloride 06/27/2017 90* 94 - 109 mmol/L Final     Carbon Dioxide 06/27/2017 36* 20 - 32 mmol/L Final     Anion Gap 06/27/2017 9  3 - 14 mmol/L Final     Glucose 06/27/2017 113* 70 - 99 mg/dL Final    Non Fasting     Urea Nitrogen 06/27/2017 15  7 - 30 mg/dL Final     Creatinine 06/27/2017 0.93  0.52 - 1.04 mg/dL Final     GFR Estimate 06/27/2017 69  >60 mL/min/1.7m2 Final    Non  GFR Calc     GFR Estimate If Black 06/27/2017 84  >60 mL/min/1.7m2 Final    African American GFR Calc     Calcium 06/27/2017 9.0  8.5 - 10.1 mg/dL Final        Most recent EKG from 6/28/2017 reviewed. QTc interval 501. Prolonged QT interval.    Review of Systems:  10 systems (general, cardiovascular, respiratory, eyes, ENT, endocrine, GI, , M/S, neurological) were reviewed. Most pertinent finding(s) is/are: no rashes. The remaining systems are all unremarkable.    Family History:   Patient reported family history includes:   Family History   Problem Relation Age of Onset     Hypertension Maternal Grandfather      HEART DISEASE Maternal Grandfather      Depression Mother      Depression Father      Depression Sister      Depression Sister      Mental Illness History: Yes: Per EPIC Electronic Medical Record. Mother with Borderline " Personality Disorder. Sisters with Attention Deficit Hyperactivity Disorder (ADHD)  Substance Abuse History: Denies  Suicide History: Denies  Medications: Lamictal (lamotrigine), Fetzima, Adderall (amphetamine salts)     Social History:   Birth place: [place], [place]  Childhood: Yes intact home  Siblings:  zero Brother(s),  two Sister(s) -youngest in sib ship  Highest education level was associate degree / vocational certificate.   Childhood illnesses: Denies  Current Living situation:  [place], [place] with Father . Feels safe at home.  Relationship/Marital Status: single   Children: two daughters age 12 and 5  Firearms/Weapons Access: No: Patient denies   Service: No    Legal History:  No: Patient denies any legal history    Significant Losses / Trauma / Abuse / Neglect Issues:  There are indications or report of significant loss, trauma, abuse or neglect issues related to: changes in child custody rights  .   Issues of possible neglect are not present.   A safety and risk management plan has not been developed at this time, however client was given the after-hours number / 911 should there be a change in any of these risk factors..    Mental Status Examination:     Appearance:  awake, alert, adequately groomed, appeared stated age, no apparent distress and thin, heavy makeup  Attitude:  evasive   Eye Contact:  adequate  Gait and Station: Normal, No assistive Devices used and No dizziness or falls  Psychomotor Behavior:  no evidence of tardive dyskinesia, dystonia, or tics  Oriented to:  time, person, and place  Attention Span and Concentration:  Normal  Speech:  clear, coherent, regular rate, regular rhythm and fluent  Mood:  anxious  Affect:  mood congruent  Associations:  no loose associations  Thought Process:  logical, linear and goal oriented  Thought Content:  no evidence of suicidal ideation or homicidal ideation, no evidence of psychotic thought and Appropriate to Interview  Recent and Remote Memory:   intact Not formally assessed. No amnesia.  Fund of Knowledge: appropriate  Insight:  fair  Judgment:  fair  Impulse Control:  intact    Suicide Risk Assessment:  Today Citlali Hawley reports denies any suicidal thoughts or depressive or bipolar symptoms. In addition, there are notable risk factors for self-harm, including age, single status, anxiety and comorbid medical condition  . However, risk is mitigated by commitment to family, ability to volunteer a safety plan, history of seeking help when needed, future oriented, no access to firearms or weapons, identifies reasons to live including children and school, denies suicidal intent or plan and denies homicidal ideation, intent, or plan. Therefore, based on all available evidence including the factors cited above, Citlali Hawley does not appear to be at imminent risk for self-harm, does not meet criteria for a 72-hr hold, and therefore remains appropriate for ongoing outpatient level of care.  A thorough assessment of risk factors related to suicide and self-harm have been reviewed and are noted above. The patient convincingly denies suicidality on several occasions. Local community resources reviewed and printed for patient to use if needed. There was no deceit detected, and the patient presented in a manner that was believable.     DSM5  Diagnosis:  Attention-Deficit/Hyperactivity Disorder  314.00 (F90.0) Predominantly inattentive presentation per patient  296.31 (F33.0) Major Depressive Disorder, Recurrent Episode, Mild With anxious distress  300.01 (F41.0) Panic Disorder  300.02 (F41.1) Generalized Anxiety Disorder  307.5 (F50.2) Bulimia Nervosa   In partial remission  Severity: Moderate   Rule out 301.83 (F60.3) Borderline Personality Disorder    Medical Comorbidities Include:   Patient Active Problem List    Diagnosis Date Noted     BV (bacterial vaginosis) 02/14/2017     Priority: Medium     Health Care Home 04/15/2016     Priority: Medium     Purging  02/27/2014     Priority: Medium     Eating disorder 02/27/2014     Priority: Medium     Mild major depression (H) 02/26/2014     Priority: Medium     Attention deficit disorder 02/26/2014     Priority: Medium     Problem list name updated by automated process. Provider to review       XIANG II (cervical intraepithelial neoplasia II) 04/16/2013     Priority: Medium     Smoker 03/22/2013     Priority: Medium     Mirena IUD insertion 05/16/2012     Priority: Medium     Lot # [id number]  Remove May 2017  Problem list name updated by automated process. Provider to review and confirm       Mild recurrent major depression (H) 07/01/2011     Priority: Medium     XIANG III (cervical intraepithelial neoplasia III) 03/23/2011     Priority: Medium     3/11/11 LSIL pap, colpo XIANG I,II,III  S/p LEEP in 3/11 pos for XIANG 2 w neg margins  6/28/11 ASCUS pap Positive HPV.  Pt to have colp post partum. EDC 12/11/11 4/12 ASCUS ,+HPV, undetermined risk  7/12 ASCUS,+HPV, undetermined risk, colpo XIANG I. Repeat pap & colp in 6 months  3/13 Pap Ascus +HPV 58, [place] XIANG I-II  4/13 LEEP XIANG I-II, + margins, in reminders for colp in 4-6 months per ASCCP guidelines  6/9/14 Dx pap NIL. HPV Neg. [place] XIANG I. Plan: [place] in 6 months  2/2015: NIL pap, neg HPV. [place] - no dysplasia. Plan cotest in 1 yr. Tracking started.  06/13/17 NIL, Neg HPV. Plan 1 yr co-test per visit notes         CARDIOVASCULAR SCREENING; LDL GOAL LESS THAN 160 02/10/2010     Priority: Medium     Panic disorder 02/03/2009     Priority: Medium     Suicide attempt in 2004.         Psychosocial & Contextual Factors:  Relationship Difficulties and Legal Difficulties    Strengths and Opportunities:   Citlali Hawley identified the following strengths or resources that will help she succeed in counseling: commitment to health and well being, friends / good social support, family support, intelligence and positive work environment. Things that may interfere with the patient's success include:   transportation concerns.    A 12-item WHODAS 2.0 assessment was completed by the patient today and recorded in EPIC.    WHODAS 2.0 TOTAL SCORES 7/27/2017   Total Score 12       The Patient Activation Measure (JANESSA) score was completed and recorded in EPIC. This assesses patient knowledge, skill, and confidence for self-management.   JANESSA Score (Last Two) 4/15/2016   JANESSA Raw Score 48   Activation Score 80   JANESSA Level 4        Impression:  Citlali Hawley would like to establish care for long term medication management of Attention Deficit Hyperactivity Disorder (ADHD), Mood Disorder, and anxiety. She is evasive and not forthcoming with information that was available in the chart until I specifically asked her. Medication side effects and alternatives reviewed. Health promotion activities recommended and reviewed today. Collaborative Care Psychiatry Service model reviewed today. Would recommend involvement in DBT programming again.     Recent and recurrent hypokalemia which may be from high doses of stimulants and concern about purging behaviors with history of eating disorder. Most recent hospital stay at [facility] due to this. PCP will not continue to presribe. I do not recommend this high of a dose of medication anyway. EKG also showed prolonged QT which the current medications may be making worse. History of a Controlled Substance Agreement with PCP. Referal placed for long term care psychiatry today. Reviewed with patient risks of medication dosing. I will not prescribe more than 60 mg of Adderall (amphetamine salts) per day. Will provide one month supply until can get in to see other psychiatry. Patient also needs to follow with PCP with hypokalemia before we will fill medication again. Also will keep Ativan (lorazepam) for patient for panic. Has rare fills per [place] . Do not recommend two benzodiazepine medications at the same time. Adderall (amphetamine salts) prescription and Ativan (lorazepam)  prescription printed and given to patient today. [facility] referral placed for long term psychiatry care.     Treatment Plan:    Continue Ativan (lorazepam) 1 mg by mouth as needed for panic.     Continue Fetzima (levomilnacipran) 80 mg by mouth daily.     Continue Lamictal (lamotrigine) 75 mg by mouth daily.     Reduce Adderall (amphetamine salts) to 60 mg daily.     Continue all other medications as reviewed per electronic medical record today.     All questions addressed. Education and counseling completed regarding risks and benefits of medications and psychotherapy options.    Safety plan was reviewed. To the Emergency Department as needed or call after hours crisis line at [phone] or [phone].     To schedule individual or family therapy, call [facility] at [phone].     Schedule an appointment with me in as needed.  Call [facility] at [phone] to schedule.    Follow up with primary care provider as planned or for acute medical concerns.    Call the psychiatric nurse line with medication questions or concerns at [phone].    My Practice Policy was reviewed and signed: YES     MyChart may be used to communicate with your provider, but this is not intended to be used for emergencies.    Additional Community Resources:    [facility] Crisis Team (24 hour/7days a week): [phone]  Crisis Intervention: 549.559.5490 or 708-975-1810 (TTY: 821.448.5299). Call anytime for help.    [employer] [place] [employer] Mental Illness (www.[place].moo.org): 751.871.6688 or 045-104-6322.   Mental Health Consumer/Survivor Network of [place] (www.mhcsn.net): 741.508.6771 or 580-051-2709    Mental [employer] Association of [place] (www.mentalhealth.org): 560.388.1343 or 437-628-1373    Administrative Billing:   Time spent with patient was 60 minutes and greater than 50% of time or 40 minutes was spent in counseling and coordination of care.    Patient Status:  Patient will continue to be seen  for ongoing consultation and stabilization. until can be established with long term psychiatry provider.     Signed:   Nadine Matthews, PhD, APRN, CNP  Psychiatry

## 2017-07-27 NOTE — MR AVS SNAPSHOT
After Visit Summary   7/27/2017    Citlali Hawley    MRN: [id number]           Patient Information     Date Of Birth          1985        Visit Information        Provider Department      7/27/2017 5:15 PM Nadine Matthews NP [facility]        Today's Diagnoses     Attention deficit hyperactivity disorder (ADHD), predominantly inattentive type    -  1    Mood disorder (H)        ADD (attention deficit disorder) without hyperactivity          Care Instructions    Treatment Plan:    Continue Ativan (lorazepam) 1 mg by mouth as needed for panic.     Continue Fetzima (levomilnacipran) 80 mg by mouth daily.     Continue Lamictal (lamotrigine) 75 mg by mouth daily.     Reduce Adderall (amphetamine salts) to 60 mg daily.     Continue all other medications as reviewed per electronic medical record today.     All questions addressed. Education and counseling completed regarding risks and benefits of medications and psychotherapy options.    Safety plan was reviewed. To the Emergency Department as needed or call after hours crisis line at [phone] or [phone].     To schedule individual or family therapy, call [facility] at [phone].     Schedule an appointment with me in as needed.  Call [facility] at [phone] to schedule.    Follow up with primary care provider as planned or for acute medical concerns.    Call the psychiatric nurse line with medication questions or concerns at [phone].    My Practice Policy was reviewed and signed: YES     MyChart may be used to communicate with your provider, but this is not intended to be used for emergencies.          Follow-ups after your visit        Additional Services     MENTAL HEALTH REFERRAL       Your provider has referred you to: Behavioral Healthcare Providers Intake Scheduling [phone]  [email or web address]    Long term psychiatry care needed.    All scheduling is subject to  "the client's specific insurance plan & benefits, provider/location availability, and provider clinical specialities.  Please arrive 15 minutes early for your first appointment and bring your completed paperwork.    Please be aware that coverage of these services is subject to the terms and limitations of your health insurance plan.  Call member services at your health plan with any benefit or coverage questions.                  Follow-up notes from your care team     Return if symptoms worsen or fail to improve.      Who to contact     If you have questions or need follow up information about today's clinic visit or your schedule please contact [facility] directly at [phone].  Normal or non-critical lab and imaging results will be communicated to you by [employer], letter or phone within 4 business days after the clinic has received the results. If you do not hear from us within 7 days, please contact the clinic through [employer] or phone. If you have a critical or abnormal lab result, we will notify you by phone as soon as possible.  Submit refill requests through [employer] or call your pharmacy and they will forward the refill request to us. Please allow 3 business days for your refill to be completed.          Additional Information About Your Visit        MyChart Information     [employer] lets you send messages to your doctor, view your test results, renew your prescriptions, schedule appointments and more. To sign up, go to [email or web address] . Click on \"Log in\" on the left side of the screen, which will take you to the Welcome page. Then click on \"Sign up Now\" on the right side of the page.     You will be asked to enter the access code listed below, as well as some personal information. Please follow the directions to create your username and password.     Your access code is: [email or web address]  Expires: 2017 10:34 PM     Your access code will  in 90 days. If you need help or a new " "code, please call your [facility] clinic or [phone].        Care EveryWhere ID     This is your Care EveryWhere ID. This could be used by other organizations to access your [facility] medical records  [id number]        Your Vitals Were     Pulse Temperature Height Pulse Oximetry BMI (Body Mass Index)       114 98.6  F (37  C) (Oral) 5' 7.5\" (1.715 m) 98% 20.99 kg/m2        Blood Pressure from Last 3 Encounters:   07/27/17 98/60   06/27/17 116/70   06/13/17 110/71    Weight from Last 3 Encounters:   07/27/17 136 lb (61.7 kg)   06/27/17 137 lb (62.1 kg)   06/13/17 137 lb (62.1 kg)              We Performed the Following     MENTAL HEALTH REFERRAL          Today's Medication Changes          These changes are accurate as of: 7/27/17  5:49 PM.  If you have any questions, ask your nurse or doctor.               These medicines have changed or have updated prescriptions.        Dose/Directions    amphetamine-dextroamphetamine 20 MG per tablet   Commonly known as:  ADDERALL   This may have changed:    - how much to take  - how to take this  - when to take this  - additional instructions   Used for:  ADD (attention deficit disorder) without hyperactivity   Changed by:  Nadine Matthews NP        40 mg in AM. 20 mg in Afternoon.   Quantity:  90 tablet   Refills:  0       FETZIMA 40 MG 24 hr capsule   This may have changed:  See the new instructions.   Used for:  Mood disorder (H)   Generic drug:  levomilnacipran   Changed by:  Nadine Matthews NP        Dose:  80 mg   Take 2 capsules (80 mg) by mouth daily   Quantity:  60 capsule   Refills:  1       lamoTRIgine 25 MG tablet   Commonly known as:  LaMICtal   Indication:  3 tabs daily   This may have changed:  how to take this   Used for:  Mood disorder (H)   Changed by:  Nadine Matthews NP        Dose:  75 mg   Take 3 tablets (75 mg) by mouth daily   Quantity:  90 tablet   Refills:  1            Where to get your medicines      These medications were sent to [facility] [address] [place], [place] - [address] [facility] [address]  [address] [facility] [address]     Phone:  [phone]     FETZIMA 40 MG 24 hr capsule    lamoTRIgine 25 MG tablet         Some of these will need a paper prescription and others can be bought over the counter.  Ask your nurse if you have questions.     Bring a paper prescription for each of these medications     amphetamine-dextroamphetamine 20 MG per tablet    LORazepam 1 MG tablet                Primary Care Provider Office Phone # Fax #    Serina David [phone]       [place] [facility] [address] [place] [address]        Equal Access to Services     FILEMON DAMICO AH: Hadii kala jacobo hadasho Soomaali, waaxda luqadaha, qaybta kaalmada adeegyada, aston charles. So [facility] [phone].    ATENCIÓN: Si habla español, tiene a mccormick disposición servicios gratuitos de asistencia lingüística. Llame al [phone].    We comply with applicable federal civil rights laws and [place] laws. We do not discriminate on the basis of race, color, national origin, age, disability sex, sexual orientation or gender identity.            Thank you!     Thank you for choosing [facility]  for your care. Our goal is always to provide you with excellent care. Hearing back from our patients is one way we can continue to improve our services. Please take a few minutes to complete the written survey that you may receive in the mail after your visit with us. Thank you!             Your Updated Medication List - Protect others around you: Learn how to safely use, store and throw away your medicines at www.disposemymeds.org.          This list is accurate as of: 7/27/17  5:49 PM.  Always use your most recent med list.                   Brand Name Dispense Instructions for use Diagnosis    amphetamine-dextroamphetamine 20 MG per tablet    ADDERALL    90 tablet    40 mg in AM. 20 mg in  Afternoon.    ADD (attention deficit disorder) without hyperactivity       FETZIMA 40 MG 24 hr capsule   Generic drug:  levomilnacipran     60 capsule    Take 2 capsules (80 mg) by mouth daily    Mood disorder (H)       lamoTRIgine 25 MG tablet    LaMICtal    90 tablet    Take 3 tablets (75 mg) by mouth daily    Mood disorder (H)       levonorgestrel 20 MCG/24HR IUD    MIRENA     1 each (20 mcg) by Intrauterine route continuous [id number]    Encounter for IUD insertion       LORazepam 1 MG tablet    ATIVAN    5 tablet    Take 1 tablet (1 mg) by mouth every 6 hours as needed for anxiety    Mood disorder (H)       potassium chloride SA 20 MEQ CR tablet    potassium chloride    60 tablet    Take 1 tablet (20 mEq) by mouth 2 times daily    Hypokalemia

## 2017-07-28 ENCOUNTER — TELEPHONE (OUTPATIENT)
Dept: PSYCHIATRY | Facility: CLINIC | Age: 32
End: 2017-07-28

## 2017-07-28 ASSESSMENT — ANXIETY QUESTIONNAIRES: GAD7 TOTAL SCORE: 4

## 2017-07-28 ASSESSMENT — PATIENT HEALTH QUESTIONNAIRE - PHQ9: SUM OF ALL RESPONSES TO PHQ QUESTIONS 1-9: 3

## 2017-07-28 NOTE — TELEPHONE ENCOUNTER
"Reason for call:  Other   Patient called regarding (reason for call): prescription  Additional comments: Pharmacist had question about Adderall rx that came through.  Patient has \"fraudulent rx\" warning.  Please call back to confirm this rx is ok to refill.       Phone number to reach patient:  Other phone number:  [phone]    Best Time:  any    Can we leave a detailed message on this number?  Not Applicable  "

## 2017-07-28 NOTE — TELEPHONE ENCOUNTER
Treatment plan  From OV with Nadine Matthews, PhD, APRN, CNP 7/27/17  Treatment Plan:    Continue Ativan (lorazepam) 1 mg by mouth as needed for panic.     Continue Fetzima (levomilnacipran) 80 mg by mouth daily.     Continue Lamictal (lamotrigine) 75 mg by mouth daily.     Reduce Adderall (amphetamine salts) to 60 mg daily.     Continue all other medications as reviewed per electronic medical record today.     All questions addressed. Education and counseling completed regarding risks and benefits of medications and psychotherapy options.    Safety plan was reviewed. To the Emergency Department as needed or call after hours crisis line at [phone] or [phone].     To schedule individual or family therapy, call [facility] at [phone].     Schedule an appointment with me in as needed.  Call [facility] at [phone] to schedule.    Follow up with primary care provider as planned or for acute medical concerns.    Call the psychiatric nurse line with medication questions or concerns at [phone].    My Practice Policy was reviewed and signed: YES     MyChart may be used to communicate with your provider, but this is not intended to be used for emergencies.

## 2017-07-28 NOTE — TELEPHONE ENCOUNTER
RN contacted pharmacy that was listed on Rx. [facility].  Per Pharmacy tech no Rx was dropped off with them.     Contacted [facility].   Per their pharmacy tech they rec'd a quantity limit issue, and Pt did not wish to have the pharmacy process a PA request.  They allowed the patient to take the paper Rx with her.     Contacted [facility] in [place].   Per their pharmacy tech they had a fraudulent Rx flag attached to Nadine Matthews, PhD, APRN, CNP BREANNA number. This was an added protection that [facility] has placed on Rx's due to a past issue with a photo copied Rx Not related to this patient at all.

## 2017-07-28 NOTE — TELEPHONE ENCOUNTER
Okay.  I printed the RX to fill at [facility] in [place] per patient request.  [id number] - [place], [place] - [address] AT [facility] [address]    Patient told me she was going to stop at our pharmacy at  to  medications before she left the clinic.  I checked the [place]  and looks like she picked up the Ativan (lorazepam) 1 mg 5 tabs at our pharmacy yesterday.  No indication she filled the Adderall (amphetamine salts) there per [place] .    Can you call our pharmacy and see if she tried to fill last night?    I would also ask the pharmacy the called us to see if she altered the amount at all. I reduced her dose as she was taking too much and I would only provide her 90 tablets of the Adderall (amphetamine salts) IR with no fill.

## 2017-08-02 ENCOUNTER — DOCUMENTATION ONLY (OUTPATIENT)
Dept: PSYCHIATRY | Facility: CLINIC | Age: 32
End: 2017-08-02

## 2017-08-02 ENCOUNTER — TELEPHONE (OUTPATIENT)
Dept: PSYCHIATRY | Facility: CLINIC | Age: 32
End: 2017-08-02

## 2017-08-02 NOTE — TELEPHONE ENCOUNTER
----- Message from Carol Freedman sent at 8/2/2017  8:30 AM CDT -----  Regarding: MENTAL HEALTH ORDER  Several attempts have been made to contact the patient. A letter has been sent advising the patient to contact [facility].    Carol Freedman  Intake Assistant, [facility]

## 2017-08-02 NOTE — PROGRESS NOTES
PA was requested from [facility].  RN completed PA. Plan responded PA was no longer required.    Citlali Hawley (Key: [id number])  Amphetamine-Dextroamphetamine 20MG tablets  Status: Question Response - N/A    Created: August 2nd, 2017

## 2017-08-08 ENCOUNTER — TELEPHONE (OUTPATIENT)
Dept: PSYCHIATRY | Facility: CLINIC | Age: 32
End: 2017-08-08

## 2017-08-08 NOTE — TELEPHONE ENCOUNTER
Will route to provider.       From 7/27/2017:  Impression:  Citlali Hawley would like to establish care for long term medication management of Attention Deficit Hyperactivity Disorder (ADHD), Mood Disorder, and anxiety. She is evasive and not forthcoming with information that was available in the chart until I specifically asked her. Medication side effects and alternatives reviewed. Health promotion activities recommended and reviewed today. Collaborative Care Psychiatry Service model reviewed today. Would recommend involvement in DBT programming again.      Recent and recurrent hypokalemia which may be from high doses of stimulants and concern about purging behaviors with history of eating disorder. Most recent hospital stay at [facility] due to this. PCP will not continue to presribe. I do not recommend this high of a dose of medication anyway. EKG also showed prolonged QT which the current medications may be making worse. History of a Controlled Substance Agreement with PCP. Referal placed for long term care psychiatry today. Reviewed with patient risks of medication dosing. I will not prescribe more than 60 mg of Adderall (amphetamine salts) per day. Will provide one month supply until can get in to see other psychiatry. Patient also needs to follow with PCP with hypokalemia before we will fill medication again. Also will keep Ativan (lorazepam) for patient for panic. Has rare fills per [place] . Do not recommend two benzodiazepine medications at the same time. Adderall (amphetamine salts) prescription and Ativan (lorazepam) prescription printed and given to patient today. [facility] referral placed for long term psychiatry care.      Treatment Plan:    Continue Ativan (lorazepam) 1 mg by mouth as needed for panic.     Continue Fetzima (levomilnacipran) 80 mg by mouth daily.     Continue Lamictal (lamotrigine) 75 mg by mouth daily.     Reduce Adderall (amphetamine salts) to 60 mg daily.     Continue all  other medications as reviewed per electronic medical record today.     All questions addressed. Education and counseling completed regarding risks and benefits of medications and psychotherapy options.    Safety plan was reviewed. To the Emergency Department as needed or call after hours crisis line at [phone] or [phone].     To schedule individual or family therapy, call [facility] at [phone].     Schedule an appointment with me in as needed.  Call [facility] at [phone] to schedule.    Follow up with primary care provider as planned or for acute medical concerns.    Call the psychiatric nurse line with medication questions or concerns at [phone].    My Practice Policy was reviewed and signed: YES     MyChart may be used to communicate with your provider, but this is not intended to be used for emergencies.     Additional Community Resources:    [facility] Crisis Team (24 hour/7days a week): [phone]  Crisis Intervention: 310.711.4532 or 029-020-5975 (TTY: 375.850.8479). Call anytime for help.    [employer] [place] [employer] Mental Illness (www.[place].moo.org): 202.979.1387 or 983-978-1518.   Mental Health Consumer/Survivor Network of [place] (www.mhcsn.net): 438.134.5619 or 651-246-0305    Mental Health Association of [place] (www.mentalhealth.org): 387.997.3453 or 743-280-1827     Administrative Billing:   Time spent with patient was 60 minutes and greater than 50% of time or 40 minutes was spent in counseling and coordination of care.     Patient Status:  Patient will continue to be seen for ongoing consultation and stabilization. until can be established with long term psychiatry provider.      Signed:   Nadine Matthews, PhD, APRN, CNP  Psychiatry

## 2017-08-08 NOTE — TELEPHONE ENCOUNTER
No. I saw patient one time to bridge.   She reports she uses Ativan (lorazepam) less than 5 times per month.   This was consistent with [place] .   I gave her 5 tablets on 7/27/2017 with no fill.   Too early to fill.  Looks like [facility] was also looking to contact her to set up long term community psychiatry and has not reached her, so they sent a letter.

## 2017-08-08 NOTE — TELEPHONE ENCOUNTER
Reason for call:  Other   Patient called regarding (reason for call): prescription  Additional comments: patient is looking for refill on ativan      Phone number to reach patient:  Home number on file [phone] (home)    Best Time:  anytime    Can we leave a detailed message on this number?  YES

## 2017-10-27 DIAGNOSIS — E87.6 HYPOKALEMIA: ICD-10-CM

## 2017-11-01 RX ORDER — POTASSIUM CHLORIDE 1500 MG/1
TABLET, EXTENDED RELEASE ORAL
Qty: 60 TABLET | Refills: 0 | Status: SHIPPED | OUTPATIENT
Start: 2017-11-01

## 2017-11-01 NOTE — TELEPHONE ENCOUNTER
Number on file not in service, called pharmacy - pt has not picked up medication, asked them to add note to medication that pt needs follow up labs and office visit. Also confirmed number on file with pharmacy, they have update phone number ([phone]). Called pt at number provided and informed her of providers note, pt will call back to schedule.     Lydia Talavera MA

## 2017-11-01 NOTE — TELEPHONE ENCOUNTER
Refilled x 1 month - patient needs potassium recheck and follow up visit in clinic - please help her to schedule

## 2017-11-14 NOTE — TELEPHONE ENCOUNTER
Spoke to patient, she is establishing care in [place], will get next refill from them.  She already filled out info to to get all her records sent from us to them.

## 2017-11-21 ENCOUNTER — DOCUMENTATION ONLY (OUTPATIENT)
Dept: PEDIATRICS | Facility: CLINIC | Age: 32
End: 2017-11-21

## 2017-11-21 NOTE — PROGRESS NOTES
Medication: amphetamine-dextroamphetamine (ADDERALL) 20 MG per tablet  Insurance phone # : [phone]  Insurance ID: [id number]    Called insurance, no PA needed as it was already initiated and approved from 06/28/17 to 06/28/18, case#: [id number].     Claim not running through insurance as pharmacy needs to call help desk to receive codes to be able to run claim through.     Lydia Talavera MA

## 2017-12-08 ENCOUNTER — TELEPHONE (OUTPATIENT)
Dept: FAMILY MEDICINE | Facility: CLINIC | Age: 32
End: 2017-12-08

## 2017-12-08 NOTE — TELEPHONE ENCOUNTER
Panel Management Review      Patient has the following on her problem list:     Depression / Dysthymia review    Measure:  Needs PHQ-9 score of 4 or less during index window.  Administer PHQ-9 and if score is 5 or more, send encounter to provider for next steps.    5 - 7 month window range:     PHQ-9 SCORE 11/15/2016 6/13/2017 7/27/2017   Total Score - - -   Total Score 16 3 3       If PHQ-9 recheck is 5 or more, route to provider for next steps.    Patient is due for:  PHQ9        Composite cancer screening  Chart review shows that this patient is due/due soon for the following None  Summary:    Patient is due/failing the following:   PHQ9    Action needed:   Patient needs to do PHQ9.    Type of outreach:    Sent letter.    Questions for provider review:    None                                                                                                                                    Radha Swenson/LEON  [place]---[facility]       Chart routed to Care Team .

## 2017-12-08 NOTE — LETTER
[address] [address]  [phone]  December 8, 2017    Citlali Hawley  [address]   [facility] [address]    Dear Citlali,    I care about your health and have reviewed your health plan. I have reviewed your medical conditions, medication list, and lab results and am making recommendations based on this review, to better manage your health.    You are in particular need of attention regarding:  -Depression    I am recommending that you:  recommendations:Fill out PHQ-9 and send back in the envelope provided.      Here is a list of Health Maintenance topics that are due now or due soon:  Health Maintenance Due   Topic Date Due     INFLUENZA VACCINE (SYSTEM ASSIGNED)  09/01/2017     DEPRESSION ACTION PLAN Q1 YR  11/15/2017       Please call us at [phone] (or use [employer]) to address the above recommendations.     Thank you for trusting [facility] and we appreciate the opportunity to serve you.  We look forward to supporting your healthcare needs in the future.    Healthy Regards,    Tova Gustafson MD/lf

## 2018-01-01 ENCOUNTER — HOSPITAL ENCOUNTER (INPATIENT)
Facility: CLINIC | Age: 33
LOS: 2 days | Discharge: LEFT BEFORE DISCHARGE | DRG: 894 | End: 2018-01-03
Attending: FAMILY MEDICINE | Admitting: PSYCHIATRY & NEUROLOGY
Payer: COMMERCIAL

## 2018-01-01 ENCOUNTER — TELEPHONE (OUTPATIENT)
Dept: BEHAVIORAL HEALTH | Facility: CLINIC | Age: 33
End: 2018-01-01

## 2018-01-01 DIAGNOSIS — F10.220 ACUTE ALCOHOLIC INTOXICATION IN ALCOHOLISM WITHOUT COMPLICATION (H): ICD-10-CM

## 2018-01-01 PROBLEM — F19.20 CHEMICAL DEPENDENCY (H): Status: ACTIVE | Noted: 2018-01-01

## 2018-01-01 LAB
ALCOHOL BREATH TEST: 0.22 (ref 0–0.01)
AMPHETAMINES UR QL SCN: NEGATIVE
BARBITURATES UR QL: NEGATIVE
BENZODIAZ UR QL: NEGATIVE
CANNABINOIDS UR QL SCN: NEGATIVE
COCAINE UR QL: NEGATIVE
ETHANOL UR QL SCN: POSITIVE
HCG UR QL: NEGATIVE
OPIATES UR QL SCN: NEGATIVE

## 2018-01-01 PROCEDURE — 80320 DRUG SCREEN QUANTALCOHOLS: CPT | Performed by: FAMILY MEDICINE

## 2018-01-01 PROCEDURE — 99284 EMERGENCY DEPT VISIT MOD MDM: CPT | Mod: Z6 | Performed by: FAMILY MEDICINE

## 2018-01-01 PROCEDURE — 81025 URINE PREGNANCY TEST: CPT | Performed by: FAMILY MEDICINE

## 2018-01-01 PROCEDURE — 25000132 ZZH RX MED GY IP 250 OP 250 PS 637: Performed by: FAMILY MEDICINE

## 2018-01-01 PROCEDURE — 12800012 ZZH R&B CD MH INTERMEDIATE ADULT

## 2018-01-01 PROCEDURE — HZ2ZZZZ DETOXIFICATION SERVICES FOR SUBSTANCE ABUSE TREATMENT: ICD-10-PCS | Performed by: PSYCHIATRY & NEUROLOGY

## 2018-01-01 PROCEDURE — 25000132 ZZH RX MED GY IP 250 OP 250 PS 637: Performed by: PSYCHIATRY & NEUROLOGY

## 2018-01-01 PROCEDURE — 99285 EMERGENCY DEPT VISIT HI MDM: CPT | Performed by: FAMILY MEDICINE

## 2018-01-01 PROCEDURE — 80307 DRUG TEST PRSMV CHEM ANLYZR: CPT | Performed by: FAMILY MEDICINE

## 2018-01-01 RX ORDER — ACETAMINOPHEN 325 MG/1
650 TABLET ORAL EVERY 4 HOURS PRN
Status: DISCONTINUED | OUTPATIENT
Start: 2018-01-01 | End: 2018-01-03 | Stop reason: HOSPADM

## 2018-01-01 RX ORDER — ALUMINA, MAGNESIA, AND SIMETHICONE 2400; 2400; 240 MG/30ML; MG/30ML; MG/30ML
30 SUSPENSION ORAL EVERY 4 HOURS PRN
Status: DISCONTINUED | OUTPATIENT
Start: 2018-01-01 | End: 2018-01-03 | Stop reason: HOSPADM

## 2018-01-01 RX ORDER — LORAZEPAM 1 MG/1
1 TABLET ORAL ONCE
Status: COMPLETED | OUTPATIENT
Start: 2018-01-01 | End: 2018-01-01

## 2018-01-01 RX ORDER — ATENOLOL 50 MG/1
50 TABLET ORAL DAILY PRN
Status: DISCONTINUED | OUTPATIENT
Start: 2018-01-01 | End: 2018-01-03 | Stop reason: HOSPADM

## 2018-01-01 RX ORDER — TRAZODONE HYDROCHLORIDE 50 MG/1
50 TABLET, FILM COATED ORAL
Status: DISCONTINUED | OUTPATIENT
Start: 2018-01-01 | End: 2018-01-03 | Stop reason: HOSPADM

## 2018-01-01 RX ORDER — ONDANSETRON 4 MG/1
4 TABLET, ORALLY DISINTEGRATING ORAL EVERY 6 HOURS PRN
Status: DISCONTINUED | OUTPATIENT
Start: 2018-01-01 | End: 2018-01-03 | Stop reason: HOSPADM

## 2018-01-01 RX ORDER — DEXTROAMPHETAMINE SACCHARATE, AMPHETAMINE ASPARTATE, DEXTROAMPHETAMINE SULFATE AND AMPHETAMINE SULFATE 7.5; 7.5; 7.5; 7.5 MG/1; MG/1; MG/1; MG/1
30 TABLET ORAL 2 TIMES DAILY
Status: ON HOLD | COMMUNITY
End: 2018-01-03

## 2018-01-01 RX ORDER — LORAZEPAM 0.5 MG/1
0.5 TABLET ORAL DAILY PRN
Status: ON HOLD | COMMUNITY
End: 2018-01-03

## 2018-01-01 RX ORDER — DIAZEPAM 5 MG
5-20 TABLET ORAL EVERY 30 MIN PRN
Status: DISCONTINUED | OUTPATIENT
Start: 2018-01-01 | End: 2018-01-03 | Stop reason: HOSPADM

## 2018-01-01 RX ORDER — BISACODYL 10 MG
10 SUPPOSITORY, RECTAL RECTAL DAILY PRN
Status: DISCONTINUED | OUTPATIENT
Start: 2018-01-01 | End: 2018-01-03 | Stop reason: HOSPADM

## 2018-01-01 RX ORDER — HYDROXYZINE HYDROCHLORIDE 25 MG/1
25-50 TABLET, FILM COATED ORAL EVERY 4 HOURS PRN
Status: DISCONTINUED | OUTPATIENT
Start: 2018-01-01 | End: 2018-01-03 | Stop reason: HOSPADM

## 2018-01-01 RX ADMIN — DIAZEPAM 10 MG: 5 TABLET ORAL at 21:01

## 2018-01-01 RX ADMIN — TRAZODONE HYDROCHLORIDE 50 MG: 50 TABLET ORAL at 22:46

## 2018-01-01 RX ADMIN — LORAZEPAM 1 MG: 1 TABLET ORAL at 18:49

## 2018-01-01 ASSESSMENT — ENCOUNTER SYMPTOMS
HEMATOLOGIC/LYMPHATIC NEGATIVE: 1
TREMORS: 1
CHILLS: 0
NERVOUS/ANXIOUS: 1
SHORTNESS OF BREATH: 0
DYSURIA: 0
COUGH: 0
PALPITATIONS: 0
NAUSEA: 0
FEVER: 0
VOMITING: 0
MYALGIAS: 0
DIARRHEA: 1

## 2018-01-01 ASSESSMENT — ACTIVITIES OF DAILY LIVING (ADL)
ORAL_HYGIENE: INDEPENDENT
DRESS: SCRUBS (BEHAVIORAL HEALTH);INDEPENDENT
GROOMING: INDEPENDENT;HANDWASHING

## 2018-01-01 NOTE — ED PROVIDER NOTES
[facility] EMERGENCY DEPARTMENT ([facility])    1/01/18     Hallway [address]   History     Chief Complaint   Patient presents with     Alcohol Intoxication     blew .220. Pt does not have a bed reserved. Has been drinking daily x 4 weeks, seeking detox. Denies any seizure withdrawals. Noted to have tremors and pt is anxious     The history is provided by the patient and medical records.     Citlali Hawley is a 32 year old female who presents with alcohol intoxication seeking alcohol detox.  She has a history of ADHD, diabetes, panic disorder, prior DBT treatment.  Patient states she has been drinking daily for the past 4 weeks.  She is here seeking detox and wants help with attaining sobriety.  She denies any history of alcohol withdrawal seizures.  Feels very anxious here. No ongoing medical problems. History of purging but not recently. Does have hx of ADD but no prescriptions amphetamines for over a month  Not suicidal or homicidal    Denies street drugs    I have reviewed the Medications, Allergies, Past Medical and Surgical History, and Social History in the Epic system.    Review of Systems   Constitutional: Negative for chills and fever.   HENT: Negative for congestion.    Respiratory: Negative for cough and shortness of breath.    Cardiovascular: Negative for chest pain and palpitations.   Gastrointestinal: Positive for diarrhea (loose stools). Negative for nausea and vomiting.   Genitourinary: Negative for dysuria.        Denies preg   Musculoskeletal: Negative for myalgias.   Neurological: Positive for tremors.   Hematological: Negative.    Psychiatric/Behavioral: Negative for suicidal ideas. The patient is nervous/anxious.        Physical Exam   BP: 146/54  Heart Rate: 99  Temp: 96.4  F (35.8  C)  Resp: 16  SpO2: 100 %      Physical Exam   Constitutional: She is oriented to person, place, and time. She appears well-developed and well-nourished. No distress.   HENT:   Head: Normocephalic and  atraumatic.   Eyes: Pupils are equal, round, and reactive to light.   Cardiovascular: Normal rate and regular rhythm.    Pulmonary/Chest: No respiratory distress.   Neurological: She is alert and oriented to person, place, and time.   Mildly tremulous   Skin: She is not diaphoretic.   Psychiatric:   Not suicidal or homicidal  No delusions or hallucinations   Nursing note and vitals reviewed.      ED Course     ED Course     Procedures          Labs Ordered and Resulted from Time of ED Arrival Up to the Time of Departure from the ED   ALCOHOL BREATH TEST POCT - Abnormal; Notable for the following:        Result Value    Alcohol Breath Test 0.220 (*)     All other components within normal limits   DRUG ABUSE SCREEN 6 CHEM DEP URINE ([facility])   HCG QUALITATIVE URINE            Assessments & Plan (with Medical Decision Making)   Admit to detox  Intake called there is a bed    I have reviewed the nursing notes.    I have reviewed the findings, diagnosis, plan and need for follow up with the patient.    New Prescriptions    No medications on file       Final diagnoses:   Acute alcoholic intoxication in alcoholism without complication (H)     Constance PHOENIX, am serving as a trained medical scribe to document services personally performed by Vicente Davey MD, based on the provider's statements to me on January 1, 2018.  This document has been checked and approved by the attending provider.    IVicente MD, was physically present and have reviewed and verified the accuracy of this note documented by Constance Joe medical scribe.       1/1/2018   [facility], [place], EMERGENCY DEPARTMENT     Vicente Davey MD  01/01/18 1810

## 2018-01-02 PROBLEM — F10.939 ALCOHOL WITHDRAWAL (H): Status: ACTIVE | Noted: 2018-01-02

## 2018-01-02 PROBLEM — F41.9 ANXIETY: Status: ACTIVE | Noted: 2018-01-02

## 2018-01-02 LAB
ALBUMIN SERPL-MCNC: 2.8 G/DL (ref 3.4–5)
ALP SERPL-CCNC: 46 U/L (ref 40–150)
ALT SERPL W P-5'-P-CCNC: 12 U/L (ref 0–50)
ANION GAP SERPL CALCULATED.3IONS-SCNC: 6 MMOL/L (ref 3–14)
AST SERPL W P-5'-P-CCNC: 19 U/L (ref 0–45)
BILIRUB SERPL-MCNC: 0.6 MG/DL (ref 0.2–1.3)
BUN SERPL-MCNC: 18 MG/DL (ref 7–30)
CALCIUM SERPL-MCNC: 7.7 MG/DL (ref 8.5–10.1)
CHLORIDE SERPL-SCNC: 107 MMOL/L (ref 94–109)
CO2 SERPL-SCNC: 29 MMOL/L (ref 20–32)
CREAT SERPL-MCNC: 0.82 MG/DL (ref 0.52–1.04)
ERYTHROCYTE [DISTWIDTH] IN BLOOD BY AUTOMATED COUNT: 14.8 % (ref 10–15)
GFR SERPL CREATININE-BSD FRML MDRD: 80 ML/MIN/1.7M2
GGT SERPL-CCNC: 22 U/L (ref 0–40)
GLUCOSE SERPL-MCNC: 86 MG/DL (ref 70–99)
HCT VFR BLD AUTO: 36 % (ref 35–47)
HGB BLD-MCNC: 11.6 G/DL (ref 11.7–15.7)
MCH RBC QN AUTO: 31.6 PG (ref 26.5–33)
MCHC RBC AUTO-ENTMCNC: 32.2 G/DL (ref 31.5–36.5)
MCV RBC AUTO: 98 FL (ref 78–100)
PLATELET # BLD AUTO: 178 10E9/L (ref 150–450)
POTASSIUM SERPL-SCNC: 3.7 MMOL/L (ref 3.4–5.3)
PROT SERPL-MCNC: 5.9 G/DL (ref 6.8–8.8)
RBC # BLD AUTO: 3.67 10E12/L (ref 3.8–5.2)
SODIUM SERPL-SCNC: 142 MMOL/L (ref 133–144)
WBC # BLD AUTO: 3.6 10E9/L (ref 4–11)

## 2018-01-02 PROCEDURE — 80053 COMPREHEN METABOLIC PANEL: CPT | Performed by: PSYCHIATRY & NEUROLOGY

## 2018-01-02 PROCEDURE — 85027 COMPLETE CBC AUTOMATED: CPT | Performed by: PSYCHIATRY & NEUROLOGY

## 2018-01-02 PROCEDURE — 12800012 ZZH R&B CD MH INTERMEDIATE ADULT

## 2018-01-02 PROCEDURE — 25000125 ZZHC RX 250: Performed by: PSYCHIATRY & NEUROLOGY

## 2018-01-02 PROCEDURE — 25000132 ZZH RX MED GY IP 250 OP 250 PS 637: Performed by: PSYCHIATRY & NEUROLOGY

## 2018-01-02 PROCEDURE — 99223 1ST HOSP IP/OBS HIGH 75: CPT | Mod: AI | Performed by: PSYCHIATRY & NEUROLOGY

## 2018-01-02 PROCEDURE — 36415 COLL VENOUS BLD VENIPUNCTURE: CPT | Performed by: PSYCHIATRY & NEUROLOGY

## 2018-01-02 PROCEDURE — 82977 ASSAY OF GGT: CPT | Performed by: PSYCHIATRY & NEUROLOGY

## 2018-01-02 RX ADMIN — DIAZEPAM 10 MG: 5 TABLET ORAL at 00:55

## 2018-01-02 RX ADMIN — DIAZEPAM 5 MG: 5 TABLET ORAL at 11:52

## 2018-01-02 RX ADMIN — DIAZEPAM 10 MG: 5 TABLET ORAL at 20:15

## 2018-01-02 RX ADMIN — HYDROXYZINE HYDROCHLORIDE 50 MG: 25 TABLET ORAL at 00:55

## 2018-01-02 RX ADMIN — DIAZEPAM 10 MG: 5 TABLET ORAL at 16:06

## 2018-01-02 RX ADMIN — TRAZODONE HYDROCHLORIDE 50 MG: 50 TABLET ORAL at 22:21

## 2018-01-02 RX ADMIN — TRAZODONE HYDROCHLORIDE 50 MG: 50 TABLET ORAL at 23:10

## 2018-01-02 RX ADMIN — NICOTINE POLACRILEX 8 MG: 4 LOZENGE ORAL at 20:34

## 2018-01-02 RX ADMIN — DIAZEPAM 10 MG: 5 TABLET ORAL at 08:47

## 2018-01-02 RX ADMIN — DIAZEPAM 10 MG: 5 TABLET ORAL at 05:03

## 2018-01-02 RX ADMIN — ONDANSETRON 4 MG: 4 TABLET, ORALLY DISINTEGRATING ORAL at 08:47

## 2018-01-02 ASSESSMENT — ACTIVITIES OF DAILY LIVING (ADL)
LAUNDRY: WITH SUPERVISION
DRESS: SCRUBS (BEHAVIORAL HEALTH);INDEPENDENT
ORAL_HYGIENE: INDEPENDENT
GROOMING: INDEPENDENT

## 2018-01-02 NOTE — TELEPHONE ENCOUNTER
S - Dr. Davey called with clinical for 33 yo female seeking detox admit.     B - Pt has been drinking quart vodka daily for last month, no hx of DT's or seizures.  No acute mental health concerns.  Pt has hx of adhd, has not taken meds in over a month.  Pt has hx of eating disorder, claims to not have purged in last month.  Pt last used today, DAVID  0.22.  UDS neg. Hcg neg.  Hx of depression, has not been on her prescribed meds for over a month, fetzima.      A - vol     R - Dr. Rodas accepted for Dr. Roblero /  3a CD  Notified charge RN on 3a of admit  Notified ED of dispo

## 2018-01-02 NOTE — PROGRESS NOTES
Met with Pt to initiate discharge planning.  Pt is requesting referral to LP.  Coached Pt to complete paperwork for assessment and referral.  Pt states she will need to discharge to home as she needs time to coordinate care for her children.  Advised Pt we could get her set up so that she can enter program from the community.  Pt acknowledged.  Bobby arias.

## 2018-01-02 NOTE — PROGRESS NOTES
01/01/18 2056   Patient Belongings   Did you bring any home meds/supplements to the hospital?  Yes   Disposition of meds  Sent to security/pharmacy per site process   Patient Belongings other (see comments)   Disposition of Belongings Other (see comment)  (storage bin, locked drawer, security)   Belongings Search Yes   Clothing Search Yes   Second Staff afshan vazquez   storage bin: purse, coat, boots, keys, 3 lighters, cigarettes  Locked drawer: 2 cell phones, wallet  [id number]: $55 cash, 4 visa, 3 master, ebt, ucare, kohls, [place] care, credit union care, $240 money gram, [place] ID  [id number]  A               Admission:  I am responsible for any personal items that are not sent to the safe or pharmacy.  [facility] is not responsible for loss, theft or damage of any property in my possession.    Signature:  _________________________________ Date: _______  Time: _____                                              Staff Signature:  ____________________________ Date: ________  Time: _____      2nd Staff person, if patient is unable/unwilling to sign:    Signature: ________________________________ Date: ________  Time: _____     Discharge:  [facility] has returned all of my personal belongings:    Signature: _________________________________ Date: ________  Time: _____                                          Staff Signature:  ____________________________ Date: ________  Time: _____

## 2018-01-02 NOTE — PLAN OF CARE
"Problem: Substance Withdrawal  Goal: Substance Withdrawal  Signs and symptoms of listed problems will be absent or manageable.     1) Patient will achieve medical stabilization of acute withdrawal sx.  2) Patient will remain safe and free from injury  3) Patient will demonstrate improvement of ADLs (appetite, hygiene)  4) Verbalize reduction of fear or anxiety to a manageable level.  5) Verbalize knowledge of substance abuse as a disease  6) Verbalize risks and negative effects related to drug ingestion  7) Demonstrate participation in unit programming and attends specific substance use group therapy (i.e AA meetings)  8) Accept referral to substance abuse treatment  9) Express sense of regaining some control of situation/life (possible by verbalizing alternative coping mechanisms as alternatives to substance use in response to stress)   Outcome: Declining    Patient presents in active withdrawal, MSSA upon first am check = 9, patient medicated with 10mg valium per unit protocol. Pt tolerating medications well. Pt did not come out for breakfast. Pt has been drinking ginger-jay and eating goldfish crackers/pretzels. Pt reports feeling anxious, restless, diaphoretic and nauseated. Pt requested/received Zofran 4mg x 1 for nausea with reported relief. Pt remains isolative/withdrawn to room, sleeping. Patient encouraged to attend/participate in unit programming. Pt denies SI/SIB/HI to this RN. Pt denies any current auditory/visual hallucinations to this RN.     Blood pressure 112/52, pulse 75, temperature 98.4  F (36.9  C), temperature source Oral, resp. rate 16, height 1.702 m (5' 7\"), weight 61.2 kg (135 lb), SpO2 98 %, unknown if currently breastfeeding.          "

## 2018-01-02 NOTE — PLAN OF CARE
Problem: Substance Withdrawal  Goal: Substance Withdrawal  Signs and symptoms of listed problems will be absent or manageable.   Outcome: Declining  Problem: Substance Withdrawal  Goal: Substance Withdrawal  Signs and symptoms of listed problems will be absent or manageable.   S:Pt is admitted via ED for Detox from alcohol.  B: Pt is drinking 1 liter of vodka daily for the past 4 weeks. She denies any other drug use. Pt has a hx of bulimia, ADHD, Panic disorder and reports that she has seen a psychiatrist in the past but currently followed by PCP in [place]. She has not taken any of her PTA medications for the past month, these were not ordered on admission but she would like to discuss restarting these with Dr. RIGGS tomorrow. 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  She is quite anxious, denies depression and reports that she her stress level is high due to current litigation between her and her two daughters paternal grandparents, they are fighting for custody. Her two daughters live with her but are staying with her mother right now. Pt reports detox x 1 10 years ago  for tramadol withdrawal and completed LP.       A: Pt is in mild withdrawal on admission. Pt denies any current suicidal ideation. Pt denies any illness or injuries.  P: Monitor and tx for alcohol withdrawal per MSSA. Assess for treatment, pt is interested in LP+.      .

## 2018-01-02 NOTE — H&P
Addiction Medicine History and Physical    Citlali Hawley MRN# [id number]   Age: 32 year old YOB: 1985     Date of Admission:  1/1/2018  Date of H&P:   January 2, 2018    Primary care provider: [facility] in [place]; Psychiatric Med Mgmt had been per per Nadine Matthews NP ([facility])          Assessment and Plan:   Assessment:   Active Problems:    Mild recurrent major depression (H)    Bulimia nervosa, by history, denies recent/current symptom use    Alcohol use disorder, severe, dependence (H)    Alcohol withdrawal (H)    Anxiety and depression, with fair medication adherence        Plan:   Admit to 3A  MSSA with diazepam  Will hold off on initiating any antidepressant for now  Dispo planning ongoing; see CM notes for details but patient expressed initial interest in LP, now open to suggestions that could better accommodate seeing children.          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 Remote history of treatment for CATHRYN involving tramadol. She indicates alcohol has been problematic in past month, but has history going back at least 3-4 years per records. Pt is drinking 1 liter of vodka daily for the past 4 weeks. She denies any other substance use. Pt has a history of bulimia nervosa, but denies recent/current symptom use. Multiple anxiety and depression diagnoses, and has not taken any of her PTA medications for the past month. Indicates all medical and psychiatric medication currently per provider in [place]. 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 Had been working at [employer] but indicates she stopped working in part due to stress of children's paternal grandparents calling her at  work. She is unclear if she wants to resume work in nursing. She is aware of HPSP and encouraged to self-report. Legal status complicated, including she indicates active custody litigation with above grandparents. She indicates she has full custody of the 12 year-old, shared of 6 year-old. Also question of recent contempt of court warrant in another state. She indicates mother currently caring for the two daughters. This morning experiences sweats, chills, anxiety. Wants treatment but unclear if LP or something else possibly closer to [place] due to children.  Is admitted voluntarily.           Past Medical History:     I have reviewed this patient's past medical history  Past Medical History:   Diagnosis Date     ASCUS on Pap smear 4/12,7/12    + HPV undeterimined risk, colpo XIANG I     XIANG 3 - cervical intraepithelial neoplasia grade 3     LEEP 3/24/11     Depression      Eating disorder 2/27/2014     History of colposcopy with cervical biopsy 3/11/11,7/12    XIANG I,II,III     Panic disorder 2/3/2009           Past Surgical History:    This patient has no significant past surgical history  I have reviewed this patient's past surgical history          Social History:   I have reviewed this patient's social history, see HPI.         Family History:     I have reviewed this patient's family history  Family History   Problem Relation Age of Onset     Hypertension Maternal Grandfather      HEART DISEASE Maternal Grandfather      Depression Mother      Depression Father      Depression Sister      Depression Sister           Immunizations:     Immunization History   Administered Date(s) Administered     Influenza (IIV3) PF 09/13/2010, 12/05/2011     Influenza Vaccine IM 3yrs+ 4 Valent IIV4 01/07/2015, 11/15/2016     TDAP Vaccine (Adacel) 12/04/2011             Allergies:     All allergies reviewed and addressed  Allergies   Allergen Reactions     Codeine Sulfate Swelling     Throat swells      Haldol [Haloperidol] Other  "(See Comments)     Confusion and breathing concerns     Penicillins              Medications:   Prescriptions Prior to Admission   Medication Sig Dispense Refill Last Dose         potassium chloride SA (K-DUR/KLOR-CON M) 20 MEQ CR tablet TAKE 1 TABLET(20 MEQ) BY MOUTH TWICE DAILY 60 tablet 0 Past Week at Unknown time     lamoTRIgine (LAMICTAL) 150 MG tablet Take 75 mg by mouth daily    Past Week at Unknown time     FETZIMA 40 MG 24 hr capsule Take 2 capsules (80 mg) by mouth daily 60 capsule 1 Past Week at Unknown time     levonorgestrel (MIRENA) 20 MCG/24HR IUD 1 each (20 mcg) by Intrauterine route continuous NDC=50419-423-01   More than a month at Unknown time      [place]  query result (previous 12 months):  Date Dispensed/  Date Prescribed Drug Name/  NDC Dosage/  Compound Dosage Qty. Dispensed/  Days Supply Refill #/  Authorized Refills RX #/  Payment Method Prescriber Dispenser Recipient **MED Daily  07/28/2017 07/28/2017 DEXTROAMP- AMPHETAMIN 20 MG TAB  [id number] Each    90  30 0  0 [id number] [Partial 02]  [facility]  [facility]  [place], [place]  [id number]  [phone] [facility]  [place], [place]  [phone] ROBERT LYON  1985  [address] [place] [address]         Review of Systems:   Complete ROS performed and is negative except as indicated in HPI and elsewhere in this note.           Physical Exam:     Vitals were reviewed  Patient Vitals for the past 12 hrs:   BP Temp Temp src Pulse Resp SpO2 Height Weight   01/02/18 0456 98/68 98.3  F (36.8  C) Oral 85 16 - - -   01/02/18 0049 111/64 97.8  F (36.6  C) Oral 94 16 - - -   01/01/18 1952 115/77 99.5  F (37.5  C) Tympanic 88 16 - 1.702 m (5' 7\") 61.2 kg (135 lb)   01/01/18 1921 107/72 98.3  F (36.8  C) Oral 92 18 98 % - -     Constitutional:   Tired but alert, non-toxic     Eyes:   Anicteric, mild injection, PERRL, no nystagmus     ENT:   No mucosal bleeding, mmm     Lungs:   no increased work of breathing and clear " "to auscultation     Cardiovascular:   Well-perfused, no murmur, no edema     Abdomen:   Active bowel sounds, soft, NT, no HSM     Neurologic:   No tremor, normal tone, gait intact, no dysmetria     Skin:   Warm, diaphoretic, no jaundice no bleeding stigmata     MENTAL STATUS EXAM  Appearance: in scrubs, ungroomed  Attitude:engaged,  cooperative  Behavior: no agitation or slowing  Eye Contact: focused on examiner  Speech: coherent, no pressuring  Orientation: oriented to person , place, time and situation  Mood: \"bad\"  Affect: tired, occasionally tearful, appropriate  Thought Process: goal-directed, no FOI or LUISA  Suicidal Ideation: denies thought/intent/plan  Hallucination: denies  Insight: partial, capable of continued improvement         Data:   All laboratory data reviewed    Results for orders placed or performed during the hospital encounter of 01/01/18   Drug abuse screen 6 urine (tox)   Result Value Ref Range    Amphetamine Qual Urine Negative NEG^Negative    Barbiturates Qual Urine Negative NEG^Negative    Benzodiazepine Qual Urine Negative NEG^Negative    Cannabinoids Qual Urine Negative NEG^Negative    Cocaine Qual Urine Negative NEG^Negative    Ethanol Qual Urine Positive (A) NEG^Negative    Opiates Qualitative Urine Negative NEG^Negative   HCG qualitative urine (UPT)   Result Value Ref Range    HCG Qual Urine Negative NEG^Negative   CBC with platelets   Result Value Ref Range    WBC 3.6 (L) 4.0 - 11.0 10e9/L    RBC Count 3.67 (L) 3.8 - 5.2 10e12/L    Hemoglobin 11.6 (L) 11.7 - 15.7 g/dL    Hematocrit 36.0 35.0 - 47.0 %    MCV 98 78 - 100 fl    MCH 31.6 26.5 - 33.0 pg    MCHC 32.2 31.5 - 36.5 g/dL    RDW 14.8 10.0 - 15.0 %    Platelet Count 178 150 - 450 10e9/L   GGT   Result Value Ref Range    GGT 22 0 - 40 U/L   Comprehensive metabolic panel   Result Value Ref Range    Sodium 142 133 - 144 mmol/L    Potassium 3.7 3.4 - 5.3 mmol/L    Chloride 107 94 - 109 mmol/L    Carbon Dioxide 29 20 - 32 mmol/L    " Anion Gap 6 3 - 14 mmol/L    Glucose 86 70 - 99 mg/dL    Urea Nitrogen 18 7 - 30 mg/dL    Creatinine 0.82 0.52 - 1.04 mg/dL    GFR Estimate 80 >60 mL/min/1.7m2    GFR Estimate If Black >90 >60 mL/min/1.7m2    Calcium 7.7 (L) 8.5 - 10.1 mg/dL    Bilirubin Total 0.6 0.2 - 1.3 mg/dL    Albumin 2.8 (L) 3.4 - 5.0 g/dL    Protein Total 5.9 (L) 6.8 - 8.8 g/dL    Alkaline Phosphatase 46 40 - 150 U/L    ALT 12 0 - 50 U/L    AST 19 0 - 45 U/L   Alcohol breath test POCT   Result Value Ref Range    Alcohol Breath Test 0.220 (A) 0.00 - 0.01     Attestation:  I have reviewed today's vital signs, notes, medications, and labs/studies pertinent to this encounter.    Indication for hospitalization is severe alcohol use disorder with physical dependence and withdrawal; high degree of complexity due to potential withdrawal morbidity, complicated by co-occurring depression/anxiety.    Charles Roblero MD  Pediatrics/Addiction Medicine

## 2018-01-02 NOTE — PROGRESS NOTES
"Patient angry that she was redirected back to her room until \"quiet time\" ended which was 10 minutes. She has WNLs VS. Her presentation is incongruent with what is observed when she is out and about on the unit. Out on the unit she laughs, smiles and interacts with other patients. When she presents to Nurse's Desk she is somatic and endorses elevated S/S of alcohol withdrawal. Her MSSA/alcohol withdrawal score: 9 and 10mg Valium given. She is resistive to redirection and verbal cues to use other means than taking Valium for her anxiety. She refused PRN hydroxyzine.     Patient request to start her \"other home medications...\" It is noted that patient has not been taking her home medications regularly. Patient now states: \"It's only been a few days since I last took them and I should be on those meds...\" Please review and med req patient's home medications.    "

## 2018-01-02 NOTE — PHARMACY-ADMISSION MEDICATION HISTORY
Admission Medication History status for the 1/1/2018 admission is complete.  See EPIC admission navigator for Prior to Admission medications.    Medication history sources:  Patient, [facility]     Medication history source reliability: Good    Medication adherence:  Moderate    Changes made to PTA medication list (reason)  Added: None  Deleted: Adderall 20mg (Pt not taking this strength), Fetzima 80mg (Pt not taking this strength), Lamotrigine 25mg (Pt not taking this strength), Lorazepam 1mg (Pt not taking this strength)  Changed:   -Lamotrigine 150mg tablet (no sig)-> Lamotrigine take 75mg by mouth daily  -Lorazepam 0.5mg tablet tk 1 t po tid prn anxiety/panic attack-> Lorazepam 0.5mg tablet take 0.5mg by mouth daily as needed for anxiety    Additional medication history information (including reliability of information, actions taken by pharmacist):   -Pt is prescribed Klor-Con 20 mEq twice daily, but patient reports only taking it once daily    Time spent in this activity: 25min    Medication history completed by: Tre Saenz, Pharmacy Intern       Prior to Admission medications    Medication Sig Last Dose Taking? Auth Provider   amphetamine-dextroamphetamine (ADDERALL) 30 MG per tablet Take 30 mg by mouth 2 times daily Past Week at Unknown time Yes Unknown, Entered By History   LORazepam (ATIVAN) 0.5 MG tablet Take 0.5 mg by mouth daily as needed for anxiety Past Week at Unknown time Yes Unknown, Entered By History   potassium chloride SA (K-DUR/KLOR-CON M) 20 MEQ CR tablet TAKE 1 TABLET(20 MEQ) BY MOUTH TWICE DAILY Past Week at Unknown time Yes Serina David MD   lamoTRIgine (LAMICTAL) 150 MG tablet Take 75 mg by mouth daily  Past Week at Unknown time Yes Reported, Patient   FETZIMA 40 MG 24 hr capsule Take 2 capsules (80 mg) by mouth daily Past Week at Unknown time Yes Nadine Matthews NP   levonorgestrel (MIRENA) 20 MCG/24HR IUD 1 each (20 mcg) by Intrauterine route continuous NDC=50419-423-01  More than a month at Unknown time  Tova Gustafson MD

## 2018-01-03 VITALS
DIASTOLIC BLOOD PRESSURE: 67 MMHG | SYSTOLIC BLOOD PRESSURE: 102 MMHG | HEIGHT: 67 IN | RESPIRATION RATE: 16 BRPM | HEART RATE: 82 BPM | WEIGHT: 135 LBS | TEMPERATURE: 97 F | OXYGEN SATURATION: 98 % | BODY MASS INDEX: 21.19 KG/M2

## 2018-01-03 PROCEDURE — 25000132 ZZH RX MED GY IP 250 OP 250 PS 637: Performed by: PSYCHIATRY & NEUROLOGY

## 2018-01-03 RX ORDER — GABAPENTIN 400 MG/1
400 CAPSULE ORAL 3 TIMES DAILY
Status: DISCONTINUED | OUTPATIENT
Start: 2018-01-03 | End: 2018-01-03 | Stop reason: HOSPADM

## 2018-01-03 RX ORDER — QUETIAPINE FUMARATE 50 MG/1
50-100 TABLET, FILM COATED ORAL 4 TIMES DAILY PRN
Status: DISCONTINUED | OUTPATIENT
Start: 2018-01-03 | End: 2018-01-03 | Stop reason: HOSPADM

## 2018-01-03 RX ADMIN — DIAZEPAM 10 MG: 5 TABLET ORAL at 04:37

## 2018-01-03 RX ADMIN — DIAZEPAM 5 MG: 5 TABLET ORAL at 08:51

## 2018-01-03 RX ADMIN — DIAZEPAM 10 MG: 5 TABLET ORAL at 00:46

## 2018-01-03 NOTE — PROGRESS NOTES
"Pt MSSA monitored through night. At midnight pt reported being sweaty and difficulty sleeping. Pt became extremely upset d/t blood pressure being below parameters to medicate. Pt began yelling at staff, demanding recheck of vitals or discharge immediately.  Vitals were understandably elevated at this point. Pt medicated with valium d/t increased vitals and irritability. At 0430 MSSA pt chose to stand for vitals, stating she \"runs low\" when lying down. Pt reported ongoing sweating, discomfort, and irritability. Pt medicated again with valium. Pt significantly calmer and more appropriate with staff on 0430 check.   "

## 2018-01-03 NOTE — PROVIDER NOTIFICATION
"Pt extremely irritable this am, swearing making demands and requesting medications.  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 She reports that she has a bunch of valium and ativan at home that she can take if we are not going to give it to her.  Pt stomped away from the desk. Spoke with Dr. RIGGS and since patient has been off her meds for a month she will need to f/u with prescriber and those medications will need to be reevaluated. Obtained orders for PRNs for agitation and anxiety. If patient persist with wanting to discharge she may go AMA if not out of detox.   "

## 2018-01-03 NOTE — PROGRESS NOTES
Pt informed of MD recommendations and became agitated, swearing and then walked away to take a phone call.  She was yelling to the person on the other line, hung up slamming the phone down and returned to her room slamming the door.  I attempted to talk with patient and offer PRN's patient continued to be verbally abusive and asking for discharge. Pt will be discharged AMA per Dr. RIGGS patient care order.

## 2018-01-05 ENCOUNTER — HOSPITAL ENCOUNTER (EMERGENCY)
Facility: CLINIC | Age: 33
Discharge: HOME | End: 2018-01-05
Attending: EMERGENCY MEDICINE | Admitting: EMERGENCY MEDICINE
Payer: COMMERCIAL

## 2018-01-05 VITALS
WEIGHT: 130 LBS | OXYGEN SATURATION: 96 % | RESPIRATION RATE: 18 BRPM | TEMPERATURE: 98.4 F | DIASTOLIC BLOOD PRESSURE: 87 MMHG | BODY MASS INDEX: 20.4 KG/M2 | HEIGHT: 67 IN | SYSTOLIC BLOOD PRESSURE: 139 MMHG

## 2018-01-05 DIAGNOSIS — F10.220 ACUTE ALCOHOLIC INTOXICATION IN ALCOHOLISM WITHOUT COMPLICATION (H): ICD-10-CM

## 2018-01-05 LAB
ALBUMIN SERPL-MCNC: 3.8 G/DL (ref 3.4–5)
ALBUMIN UR-MCNC: NEGATIVE MG/DL
ALP SERPL-CCNC: 53 U/L (ref 40–150)
ALT SERPL W P-5'-P-CCNC: 18 U/L (ref 0–50)
AMORPH CRY #/AREA URNS HPF: ABNORMAL /HPF
AMPHETAMINES UR QL SCN: NEGATIVE
ANION GAP SERPL CALCULATED.3IONS-SCNC: 9 MMOL/L (ref 3–14)
APPEARANCE UR: ABNORMAL
AST SERPL W P-5'-P-CCNC: 18 U/L (ref 0–45)
BACTERIA #/AREA URNS HPF: ABNORMAL /HPF
BARBITURATES UR QL: NEGATIVE
BASOPHILS # BLD AUTO: 0 10E9/L (ref 0–0.2)
BASOPHILS NFR BLD AUTO: 0.6 %
BENZODIAZ UR QL: POSITIVE
BILIRUB SERPL-MCNC: 0.5 MG/DL (ref 0.2–1.3)
BILIRUB UR QL STRIP: NEGATIVE
BUN SERPL-MCNC: 9 MG/DL (ref 7–30)
CALCIUM SERPL-MCNC: 8.3 MG/DL (ref 8.5–10.1)
CANNABINOIDS UR QL SCN: NEGATIVE
CHLORIDE SERPL-SCNC: 110 MMOL/L (ref 94–109)
CO2 SERPL-SCNC: 23 MMOL/L (ref 20–32)
COCAINE UR QL: NEGATIVE
COLOR UR AUTO: YELLOW
CREAT SERPL-MCNC: 0.85 MG/DL (ref 0.52–1.04)
DIFFERENTIAL METHOD BLD: NORMAL
EOSINOPHIL # BLD AUTO: 0.1 10E9/L (ref 0–0.7)
EOSINOPHIL NFR BLD AUTO: 2.3 %
ERYTHROCYTE [DISTWIDTH] IN BLOOD BY AUTOMATED COUNT: 15 % (ref 10–15)
ETHANOL SERPL-MCNC: 0.22 G/DL
GFR SERPL CREATININE-BSD FRML MDRD: 77 ML/MIN/1.7M2
GLUCOSE SERPL-MCNC: 83 MG/DL (ref 70–99)
GLUCOSE UR STRIP-MCNC: NEGATIVE MG/DL
HCG UR QL: NEGATIVE
HCT VFR BLD AUTO: 38.7 % (ref 35–47)
HGB BLD-MCNC: 12.9 G/DL (ref 11.7–15.7)
HGB UR QL STRIP: NEGATIVE
IMM GRANULOCYTES # BLD: 0 10E9/L (ref 0–0.4)
IMM GRANULOCYTES NFR BLD: 0.4 %
KETONES UR STRIP-MCNC: NEGATIVE MG/DL
LEUKOCYTE ESTERASE UR QL STRIP: NEGATIVE
LYMPHOCYTES # BLD AUTO: 2.2 10E9/L (ref 0.8–5.3)
LYMPHOCYTES NFR BLD AUTO: 45.9 %
MCH RBC QN AUTO: 32.4 PG (ref 26.5–33)
MCHC RBC AUTO-ENTMCNC: 33.3 G/DL (ref 31.5–36.5)
MCV RBC AUTO: 97 FL (ref 78–100)
MONOCYTES # BLD AUTO: 0.4 10E9/L (ref 0–1.3)
MONOCYTES NFR BLD AUTO: 8.6 %
MUCOUS THREADS #/AREA URNS LPF: PRESENT /LPF
NEUTROPHILS # BLD AUTO: 2 10E9/L (ref 1.6–8.3)
NEUTROPHILS NFR BLD AUTO: 42.2 %
NITRATE UR QL: NEGATIVE
NRBC # BLD AUTO: 0 10*3/UL
NRBC BLD AUTO-RTO: 0 /100
OPIATES UR QL SCN: NEGATIVE
PCP UR QL SCN: NEGATIVE
PH UR STRIP: 9 PH (ref 5–7)
PLATELET # BLD AUTO: 220 10E9/L (ref 150–450)
POTASSIUM SERPL-SCNC: 4.2 MMOL/L (ref 3.4–5.3)
PROT SERPL-MCNC: 7.5 G/DL (ref 6.8–8.8)
RBC # BLD AUTO: 3.98 10E12/L (ref 3.8–5.2)
RBC #/AREA URNS AUTO: 2 /HPF (ref 0–2)
SODIUM SERPL-SCNC: 142 MMOL/L (ref 133–144)
SOURCE: ABNORMAL
SP GR UR STRIP: 1.01 (ref 1–1.03)
SQUAMOUS #/AREA URNS AUTO: 5 /HPF (ref 0–1)
UROBILINOGEN UR STRIP-MCNC: 0 MG/DL (ref 0–2)
WBC # BLD AUTO: 4.8 10E9/L (ref 4–11)
WBC #/AREA URNS AUTO: 3 /HPF (ref 0–2)

## 2018-01-05 PROCEDURE — 85025 COMPLETE CBC W/AUTO DIFF WBC: CPT | Performed by: EMERGENCY MEDICINE

## 2018-01-05 PROCEDURE — 96361 HYDRATE IV INFUSION ADD-ON: CPT

## 2018-01-05 PROCEDURE — 81001 URINALYSIS AUTO W/SCOPE: CPT | Performed by: EMERGENCY MEDICINE

## 2018-01-05 PROCEDURE — 99285 EMERGENCY DEPT VISIT HI MDM: CPT | Mod: 25

## 2018-01-05 PROCEDURE — 25000128 H RX IP 250 OP 636: Performed by: EMERGENCY MEDICINE

## 2018-01-05 PROCEDURE — 90791 PSYCH DIAGNOSTIC EVALUATION: CPT

## 2018-01-05 PROCEDURE — 96374 THER/PROPH/DIAG INJ IV PUSH: CPT

## 2018-01-05 PROCEDURE — 80053 COMPREHEN METABOLIC PANEL: CPT | Performed by: EMERGENCY MEDICINE

## 2018-01-05 PROCEDURE — 81025 URINE PREGNANCY TEST: CPT | Performed by: EMERGENCY MEDICINE

## 2018-01-05 PROCEDURE — 80307 DRUG TEST PRSMV CHEM ANLYZR: CPT | Performed by: EMERGENCY MEDICINE

## 2018-01-05 PROCEDURE — 80320 DRUG SCREEN QUANTALCOHOLS: CPT | Performed by: EMERGENCY MEDICINE

## 2018-01-05 RX ORDER — ONDANSETRON 2 MG/ML
4 INJECTION INTRAMUSCULAR; INTRAVENOUS
Status: COMPLETED | OUTPATIENT
Start: 2018-01-05 | End: 2018-01-05

## 2018-01-05 RX ORDER — SODIUM CHLORIDE 9 MG/ML
1000 INJECTION, SOLUTION INTRAVENOUS CONTINUOUS
Status: DISCONTINUED | OUTPATIENT
Start: 2018-01-05 | End: 2018-01-06 | Stop reason: HOSPADM

## 2018-01-05 RX ORDER — OLANZAPINE 5 MG/1
5 TABLET, ORALLY DISINTEGRATING ORAL ONCE
Status: DISCONTINUED | OUTPATIENT
Start: 2018-01-05 | End: 2018-01-06 | Stop reason: HOSPADM

## 2018-01-05 RX ORDER — LORAZEPAM 2 MG/ML
1 INJECTION INTRAMUSCULAR
Status: DISCONTINUED | OUTPATIENT
Start: 2018-01-05 | End: 2018-01-06 | Stop reason: HOSPADM

## 2018-01-05 RX ADMIN — ONDANSETRON 4 MG: 2 INJECTION INTRAMUSCULAR; INTRAVENOUS at 22:18

## 2018-01-05 RX ADMIN — SODIUM CHLORIDE 1000 ML: 9 INJECTION, SOLUTION INTRAVENOUS at 22:18

## 2018-01-05 ASSESSMENT — ENCOUNTER SYMPTOMS
VOMITING: 1
DIARRHEA: 1
NAUSEA: 1
AGITATION: 1
ANAL BLEEDING: 0
HALLUCINATIONS: 0
NERVOUS/ANXIOUS: 1
BLOOD IN STOOL: 0

## 2018-01-05 NOTE — DISCHARGE SUMMARY
Addiction Medicine Discharge Summary    Citlali Hawley MRN# [id number]   Age: 32 year old YOB: 1985     Date of Admission:  1/1/2018  Date of Discharge::  1/3/2018 12:00 PM  Admitting Physician:  Charles Roblero MD  Discharge Physician:  Charles Roblero MD     Primary care provider: [facility] in [place]; Psychiatric Med Mgmt had been per per Nadine Matthews NP ([facility])          Admission Diagnoses:   Active Problems:    Mild recurrent major depression (H)    Bulimia nervosa by history    Alcohol use disorder, severe, dependence (H)    Alcohol withdrawal (H)    Anxiety          Discharge Diagnosis:   Active Problems:    Mild recurrent major depression (H)    Bulimia nervosa by history, no active symptoms    Alcohol use disorder, severe, dependence (H), poor insight    Alcohol withdrawal (H), moderate, demanded discharge prior to completion of detoxification process    Anxiety and emotional dysregulation    Leukopenia and anemia, mild, in part alcohol-related         Procedures:   All laboratory data reviewed  Results for orders placed or performed during the hospital encounter of 01/01/18   Drug abuse screen 6 urine (tox)   Result Value Ref Range    Amphetamine Qual Urine Negative NEG^Negative    Barbiturates Qual Urine Negative NEG^Negative    Benzodiazepine Qual Urine Negative NEG^Negative    Cannabinoids Qual Urine Negative NEG^Negative    Cocaine Qual Urine Negative NEG^Negative    Ethanol Qual Urine Positive (A) NEG^Negative    Opiates Qualitative Urine Negative NEG^Negative   HCG qualitative urine (UPT)   Result Value Ref Range    HCG Qual Urine Negative NEG^Negative   CBC with platelets   Result Value Ref Range    WBC 3.6 (L) 4.0 - 11.0 10e9/L    RBC Count 3.67 (L) 3.8 - 5.2 10e12/L    Hemoglobin 11.6 (L) 11.7 - 15.7 g/dL    Hematocrit 36.0 35.0 - 47.0 %    MCV 98 78 - 100 fl    MCH 31.6 26.5 - 33.0 pg    MCHC 32.2 31.5 - 36.5 g/dL    RDW 14.8 10.0 - 15.0 %    Platelet Count 178  150 - 450 10e9/L   GGT   Result Value Ref Range    GGT 22 0 - 40 U/L   Comprehensive metabolic panel   Result Value Ref Range    Sodium 142 133 - 144 mmol/L    Potassium 3.7 3.4 - 5.3 mmol/L    Chloride 107 94 - 109 mmol/L    Carbon Dioxide 29 20 - 32 mmol/L    Anion Gap 6 3 - 14 mmol/L    Glucose 86 70 - 99 mg/dL    Urea Nitrogen 18 7 - 30 mg/dL    Creatinine 0.82 0.52 - 1.04 mg/dL    GFR Estimate 80 >60 mL/min/1.7m2    GFR Estimate If Black >90 >60 mL/min/1.7m2    Calcium 7.7 (L) 8.5 - 10.1 mg/dL    Bilirubin Total 0.6 0.2 - 1.3 mg/dL    Albumin 2.8 (L) 3.4 - 5.0 g/dL    Protein Total 5.9 (L) 6.8 - 8.8 g/dL    Alkaline Phosphatase 46 40 - 150 U/L    ALT 12 0 - 50 U/L    AST 19 0 - 45 U/L   Alcohol breath test POCT   Result Value Ref Range    Alcohol Breath Test 0.220 (A) 0.00 - 0.01   Corrected calcium normal          Medications Prior to Admission:     Prescriptions Prior to Admission   Medication Sig Dispense Refill Last Dose                           potassium chloride SA (K-DUR/KLOR-CON M) 20 MEQ CR tablet TAKE 1 TABLET(20 MEQ) BY MOUTH TWICE DAILY 60 tablet 0 Past Week at Unknown time     lamoTRIgine (LAMICTAL) 150 MG tablet Take 75 mg by mouth daily      Past Month at Unknown time     FETZIMA 40 MG 24 hr capsule Take 2 capsules (80 mg) by mouth daily 60 capsule 1 Past Month at Unknown time     levonorgestrel (MIRENA) 20 MCG/24HR IUD 1 each (20 mcg) by Intrauterine route continuous [id number]     More than a month at Unknown time       [place]  query result (previous 12 months):  Date Dispensed/  Date Prescribed                    Drug Name/  NDC              Dosage/  Compound Dosage               Qty. Dispensed/  Days Supply              Refill #/  Authorized Refills                  RX #/  Payment Method                   Prescriber                  Dispenser                  Recipient                   **MED Daily  07/28/2017 07/28/2017                DEXTROAMP- AMPHETAMIN 20 MG TAB  [id number]             Each                        90  30                  0  0                    [id number] [Partial 02]  Medicaid                    [facility] REBEKAH MAJOR (NP)  LEONEL HERNANDEZ  [id number]  [phone]            [facility]  LEONEL SOLORIO  [phone]            ROBERT LYON  1985  [id number] [id number]  \        Discharge Medications:     Discharge Medication List as of 1/3/2018 12:00 PM      CONTINUE these medications which have NOT CHANGED    Details   potassium chloride SA (K-DUR/KLOR-CON M) 20 MEQ CR tablet TAKE 1 TABLET(20 MEQ) BY MOUTH TWICE DAILY, Disp-60 tablet, R-0, E-Prescribe      lamoTRIgine (LAMICTAL) 150 MG tablet Take 75 mg by mouth daily , Historical      FETZIMA 40 MG 24 hr capsule Take 2 capsules (80 mg) by mouth daily, Disp-60 capsule, R-1, JAZMIN, E-Prescribe      levonorgestrel (MIRENA) 20 MCG/24HR IUD 1 each (20 mcg) by Intrauterine route continuous GMU=[id number] Print Out         STOP taking these medications       amphetamine-dextroamphetamine (ADDERALL) 30 MG per tablet Comments:   Reason for Stopping:         LORazepam (ATIVAN) 0.5 MG tablet Comments:   Reason for Stopping:                     Consultations:   No consultations were requested during this admission          Brief History of Illness:   This patient is a 32 year old partnered female, unemployed nurse (LPN), 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 Remote history of treatment for CATHRYN involving tramadol. She indicates alcohol has been problematic in past month, but has history going back at least 3-4 years per records. Pt is drinking 1 liter of vodka daily for the past 4 weeks. She denies any other substance use. Pt has a history of bulimia nervosa, but denies recent/current symptom use. Multiple anxiety and depression diagnoses, and has not taken any of her PTA medications for the past month. Indicates all medical and  psychiatric medication currently per provider in [place]. 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 Had been working at [employer] but indicates she stopped working in part due to stress of children's paternal grandparents calling her at work. She is unclear if she wants to resume work in nursing. She is aware of HPSP and encouraged to self-report. Legal status complicated, including she indicates active custody litigation with above grandparents. She indicates she has full custody of the 12 year-old, shared of 6 year-old. Also question of recent contempt of court warrant in another state. She indicates mother currently caring for the two daughters. This morning experiences sweats, chills, anxiety. Wants treatment but unclear if LP or something else possibly closer to [place] due to children.  Is admitted voluntarily.          Hospital Course:   Alcohol withdrawal managed via MSSA with diazepam. Unfortunately, patient quickly became increasingly labile and demanding. At first to restart psychiatric medications she had not taken in over a month PTA, and with no currently psychiatric follow-up in place. Then insisting she needed diazepam when RN assessment indicated otherwise. She was offered several options to manage anxiety, which she declined. She finally insisted upon discharge even though still in active detoxification status and prior to treatment planning complete. She was not holdable, so discharge granted but is AMA.          Discharge Instructions and Follow-Up:   Discharge diet: Regular   Discharge activity: Activity as tolerated   Discharge follow-up: See AVS           Discharge Disposition:   Discharged to self, AMA due to requesting discharge prior to completion of detoxification process      Attestation:  I have reviewed today's vital signs, notes, medications, and labs/studies pertinent to this admission.    Charles Roblero MD   Pediatrics/Addiction Medicine

## 2018-01-06 ENCOUNTER — HOSPITAL ENCOUNTER (EMERGENCY)
Facility: CLINIC | Age: 33
Discharge: HOME | End: 2018-01-06
Attending: EMERGENCY MEDICINE | Admitting: EMERGENCY MEDICINE
Payer: COMMERCIAL

## 2018-01-06 VITALS
OXYGEN SATURATION: 100 % | RESPIRATION RATE: 24 BRPM | SYSTOLIC BLOOD PRESSURE: 117 MMHG | DIASTOLIC BLOOD PRESSURE: 87 MMHG | TEMPERATURE: 98.3 F

## 2018-01-06 VITALS
HEART RATE: 100 BPM | OXYGEN SATURATION: 97 % | RESPIRATION RATE: 18 BRPM | BODY MASS INDEX: 20.36 KG/M2 | WEIGHT: 130 LBS | DIASTOLIC BLOOD PRESSURE: 49 MMHG | TEMPERATURE: 97.5 F | SYSTOLIC BLOOD PRESSURE: 105 MMHG

## 2018-01-06 DIAGNOSIS — F43.9 SITUATIONAL STRESS: ICD-10-CM

## 2018-01-06 DIAGNOSIS — F10.220 ACUTE ALCOHOLIC INTOXICATION IN ALCOHOLISM WITHOUT COMPLICATION (H): ICD-10-CM

## 2018-01-06 DIAGNOSIS — F10.220 ALCOHOL DEPENDENCE WITH ACUTE ALCOHOLIC INTOXICATION WITHOUT COMPLICATION (H): ICD-10-CM

## 2018-01-06 LAB
ALCOHOL BREATH TEST: 0.23 (ref 0–0.01)
AMPHETAMINES UR QL SCN: NEGATIVE
BARBITURATES UR QL: NEGATIVE
BENZODIAZ UR QL: POSITIVE
CANNABINOIDS UR QL SCN: NEGATIVE
COCAINE UR QL: NEGATIVE
ETHANOL SERPL-MCNC: 0.32 G/DL
ETHANOL UR QL SCN: POSITIVE
OPIATES UR QL SCN: NEGATIVE

## 2018-01-06 PROCEDURE — 36415 COLL VENOUS BLD VENIPUNCTURE: CPT | Performed by: EMERGENCY MEDICINE

## 2018-01-06 PROCEDURE — 25000132 ZZH RX MED GY IP 250 OP 250 PS 637: Performed by: EMERGENCY MEDICINE

## 2018-01-06 PROCEDURE — 80307 DRUG TEST PRSMV CHEM ANLYZR: CPT | Performed by: FAMILY MEDICINE

## 2018-01-06 PROCEDURE — 80320 DRUG SCREEN QUANTALCOHOLS: CPT | Performed by: FAMILY MEDICINE

## 2018-01-06 PROCEDURE — 82075 ASSAY OF BREATH ETHANOL: CPT | Performed by: EMERGENCY MEDICINE

## 2018-01-06 PROCEDURE — 99283 EMERGENCY DEPT VISIT LOW MDM: CPT | Mod: Z6 | Performed by: EMERGENCY MEDICINE

## 2018-01-06 PROCEDURE — 99283 EMERGENCY DEPT VISIT LOW MDM: CPT | Performed by: EMERGENCY MEDICINE

## 2018-01-06 PROCEDURE — 99283 EMERGENCY DEPT VISIT LOW MDM: CPT

## 2018-01-06 PROCEDURE — 80320 DRUG SCREEN QUANTALCOHOLS: CPT | Performed by: EMERGENCY MEDICINE

## 2018-01-06 RX ORDER — LAMOTRIGINE 25 MG/1
75 TABLET ORAL DAILY
Qty: 9 TABLET | Refills: 0 | Status: SHIPPED | OUTPATIENT
Start: 2018-01-06 | End: 2018-01-09

## 2018-01-06 RX ORDER — OLANZAPINE 5 MG/1
10 TABLET, ORALLY DISINTEGRATING ORAL ONCE
Status: DISCONTINUED | OUTPATIENT
Start: 2018-01-06 | End: 2018-01-06

## 2018-01-06 RX ORDER — LORAZEPAM 1 MG/1
2 TABLET ORAL ONCE
Status: COMPLETED | OUTPATIENT
Start: 2018-01-06 | End: 2018-01-06

## 2018-01-06 RX ORDER — LORAZEPAM 1 MG/1
1 TABLET ORAL ONCE
Status: COMPLETED | OUTPATIENT
Start: 2018-01-06 | End: 2018-01-06

## 2018-01-06 RX ADMIN — LORAZEPAM 1 MG: 1 TABLET ORAL at 17:42

## 2018-01-06 RX ADMIN — LORAZEPAM 2 MG: 1 TABLET ORAL at 18:55

## 2018-01-06 ASSESSMENT — ENCOUNTER SYMPTOMS
ABDOMINAL PAIN: 0
TREMORS: 1
DIFFICULTY URINATING: 0
ARTHRALGIAS: 0
SHORTNESS OF BREATH: 0
HEADACHES: 0
NAUSEA: 1
DYSPHORIC MOOD: 1
HALLUCINATIONS: 0
COLOR CHANGE: 0
EYE REDNESS: 0
NECK STIFFNESS: 0
CONFUSION: 0
VOMITING: 1
FEVER: 0

## 2018-01-06 NOTE — ED AVS SNAPSHOT
[facility] Emergency Department    [address] [place] [address]    Phone:  [phone]    Fax:  [phone]                                       Citlali Hawley   MRN: [id number]    Department:  [facility] Emergency Department   Date of Visit:  1/6/2018           Patient Information     Date Of Birth          1985        Your diagnoses for this visit were:     Acute alcoholic intoxication in alcoholism without complication (H)     Situational stress        You were seen by Dee Knutson MD.      24 Hour Appointment Hotline       To make an appointment at any [facility] clinic, call [phone] ([phone]). If you don't have a family doctor or clinic, we will help you find one. [facility] clinics are conveniently located to serve the needs of you and your family.             Review of your medicines      Our records show that you are taking the medicines listed below. If these are incorrect, please call your family doctor or clinic.        Dose / Directions Last dose taken    FETZIMA 40 MG 24 hr capsule   Dose:  80 mg   Quantity:  60 capsule   Generic drug:  levomilnacipran        Take 2 capsules (80 mg) by mouth daily   Refills:  1        lamoTRIgine 150 MG tablet   Commonly known as:  LaMICtal   Dose:  75 mg        Take 75 mg by mouth daily   Refills:  0        levonorgestrel 20 MCG/24HR IUD   Commonly known as:  MIRENA   Dose:  1 each        1 each (20 mcg) by Intrauterine route continuous SMC=[id number]   Refills:  0        LORAZEPAM PO        Refills:  0        potassium chloride SA 20 MEQ CR tablet   Commonly known as:  K-DUR/KLOR-CON M   Quantity:  60 tablet        TAKE 1 TABLET(20 MEQ) BY MOUTH TWICE DAILY   Refills:  0                Procedures and tests performed during your visit     Alcohol level blood      Orders Needing Specimen Collection     None      Pending Results     No orders found from 1/4/2018 to 1/7/2018.             Pending Culture Results     No orders found from 1/4/2018 to 1/7/2018.            Pending Results Instructions     If you had any lab results that were not finalized at the time of your Discharge, you can call the ED Lab Result RN at [phone]. You will be contacted by this team for any positive Lab results or changes in treatment. The nurses are available 7 days a week from 10A to 6:30P.  You can leave a message 24 hours per day and they will return your call.        Test Results From Your Hospital Stay        1/6/2018  5:29 PM      Component Results     Component Value Ref Range & Units Status    Ethanol g/dL 0.32 (HH) <0.01 g/dL Final    Critical Value called to and read back by  JABARI DUNAWAY (CARLA) 1.6.17 AT  [address] [facility]                  Clinical Quality Measure: Blood Pressure Screening     Your blood pressure was checked while you were in the emergency department today. The last reading we obtained was  BP: 117/87 . Please read the guidelines below about what these numbers mean and what you should do about them.  If your systolic blood pressure (the top number) is less than 120 and your diastolic blood pressure (the bottom number) is less than 80, then your blood pressure is normal. There is nothing more that you need to do about it.  If your systolic blood pressure (the top number) is 120-139 or your diastolic blood pressure (the bottom number) is 80-89, your blood pressure may be higher than it should be. You should have your blood pressure rechecked within a year by a primary care provider.  If your systolic blood pressure (the top number) is 140 or greater or your diastolic blood pressure (the bottom number) is 90 or greater, you may have high blood pressure. High blood pressure is treatable, but if left untreated over time it can put you at risk for heart attack, stroke, or kidney failure. You should have your blood pressure rechecked by a primary care provider within the next 4 weeks.  If your  "provider in the emergency department today gave you specific instructions to follow-up with your doctor or provider even sooner than that, you should follow that instruction and not wait for up to 4 weeks for your follow-up visit.        Thank you for choosing [facility]       Thank you for choosing [facility] for your care. Our goal is always to provide you with excellent care. Hearing back from our patients is one way we can continue to improve our services. Please take a few minutes to complete the written survey that you may receive in the mail after you visit with us. Thank you!        Quibly Information     Quibly lets you send messages to your doctor, view your test results, renew your prescriptions, schedule appointments and more. To sign up, go to [email or web address] . Click on \"Log in\" on the left side of the screen, which will take you to the Welcome page. Then click on \"Sign up Now\" on the right side of the page.     You will be asked to enter the access code listed below, as well as some personal information. Please follow the directions to create your username and password.     Your access code is: [email or web address]  Expires: 2018  9:12 PM     Your access code will  in 90 days. If you need help or a new code, please call your [facility] clinic or [phone].        Care EveryWhere ID     This is your Care EveryWhere ID. This could be used by other organizations to access your [facility] medical records  [id number]        Equal Access to Services     FILEMON DAMICO : Hadii kala dunno Soandrea, waaxda luqadaha, qaybta kaalmada adeegyada, aston de leon . So [facility] [phone].    ATENCIÓN: Si habla español, tiene a mccormick disposición servicios gratuitos de asistencia lingüística. Joce al [phone].    We comply with applicable federal civil rights laws and [place] laws. We do not discriminate on the basis of race, color, national origin, age, disability, sex, sexual " orientation, or gender identity.            After Visit Summary       This is your record. Keep this with you and show to your community pharmacist(s) and doctor(s) at your next visit.

## 2018-01-06 NOTE — ED PROVIDER NOTES
History     Chief Complaint   Patient presents with     Alcohol Intoxication     drawzy, denies nausea, vomiting, body aches.     HPI  Citlali Hawley is a 32 year old female with a history of eating disorder, anxiety, depression who comes to the Emergency Department seeking alcohol detox. The patient reports due to stress ongoing with custody litigation she has been drinking daily for at least the last five weeks. She drinks about 1 L of vodka per day. She has had half of a liter of vodka today thus far, her last drink being about 30 minutes prior to arrival.  She reports she has never been through chemical dependency treatment in the past. Per review of [facility], she went through [place] detox 1/1-1/2/2018 (5 days ago). She reports she experiences withdrawal symptoms like shaking, sweating and anxiety. No history of withdrawal seizures or DTs. She tried to seek treatment at [facility] yesterday but eventually left because she reports it took too long. She is interested in completing treatment at [facility] following detox. No suicidal ideation. No nausea or vomiting. No illicit drug use.      I have reviewed the Medications, Allergies, Past Medical and Surgical History, and Social History in the [employer] system.  Past Medical History:   Diagnosis Date     ASCUS on Pap smear 4/12,7/12    + HPV undeterimined risk, colpo XIANG I     Bulimia      XIANG 3 - cervical intraepithelial neoplasia grade 3     LEEP 3/24/11     Depression      Depressive disorder      Eating disorder 2/27/2014     History of colposcopy with cervical biopsy 3/11/11,7/12    XIANG I,II,III     Panic disorder 2/3/2009       Past Surgical History:   Procedure Laterality Date     COLPOSCOPY CERVIX, LOOP ELECTRODE BIOPSY, COMBINED  3/25/11    XIANG I, II and III     HC LEVONORGESTREL IU 52MG 5 YR N/A 05/16/2012     LAPAROSCOPY       LEEP TX, CERVICAL      4/15/2013     TONSILLECTOMY  1994       Family History   Problem Relation Age of Onset     Hypertension Maternal  Grandfather      HEART DISEASE Maternal Grandfather      Depression Mother      Depression Father      Depression Sister      Depression Sister        Social History   Substance Use Topics     Smoking status: Current Some Day Smoker     Packs/day: 1.00     Years: 15.00     Types: Cigarettes     Smokeless tobacco: Former User     Alcohol use Yes      Comment: 1 Liter of Vodka for past 5 weeks       No current facility-administered medications for this encounter.      Current Outpatient Prescriptions   Medication     LORAZEPAM PO     potassium chloride SA (K-DUR/KLOR-CON M) 20 MEQ CR tablet     lamoTRIgine (LAMICTAL) 150 MG tablet     FETZIMA 40 MG 24 hr capsule     levonorgestrel (MIRENA) 20 MCG/24HR IUD          Allergies   Allergen Reactions     Ativan [Lorazepam] Swelling     Codeine Sulfate Swelling     Throat swells      Haldol [Haloperidol] Other (See Comments)     Confusion and breathing concerns     Penicillins       Review of Systems   Constitutional: Negative for fever.   HENT: Negative for congestion.    Eyes: Negative for redness.   Respiratory: Negative for shortness of breath.    Cardiovascular: Negative for chest pain.   Gastrointestinal: Negative for abdominal pain.   Genitourinary: Negative for difficulty urinating.   Musculoskeletal: Negative for arthralgias and neck stiffness.   Skin: Negative for color change.   Neurological: Negative for headaches.   Psychiatric/Behavioral: Negative for confusion.        Seeking alcohol withdrawal   All other systems reviewed and are negative.      Physical Exam   BP: 105/49  Pulse: 100  Temp: 97.5  F (36.4  C)  Resp: 18  Weight: 59 kg (130 lb)  SpO2: 97 %      Physical Exam   Constitutional: She appears well-developed. She is sedated.   Smells strongly of alcohol     HENT:   Head: Normocephalic and atraumatic.   Eyes: Conjunctivae are normal. No scleral icterus. Right eye exhibits nystagmus. Left eye exhibits nystagmus.   Neck: Neck supple.   Cardiovascular:  Regular rhythm.    Pulmonary/Chest: Breath sounds normal.   Abdominal: Soft. Bowel sounds are normal. There is no hepatosplenomegaly. There is no tenderness.   Neurological: She is alert. She displays no tremor. A cranial nerve deficit (nystagmus noted bilateral lateral gaze) is present. She displays no seizure activity. Coordination and gait normal. GCS eye subscore is 4. GCS verbal subscore is 5. GCS motor subscore is 6.   Skin: Skin is warm, dry and intact.   Psychiatric: She has a normal mood and affect. Her speech is slurred. She is slowed.   Nursing note and vitals reviewed.      ED Course     ED Course     Procedures        Results for orders placed or performed during the hospital encounter of 01/06/18   Alcohol breath test POCT   Result Value Ref Range    Alcohol Breath Test 0.233 (A) 0.00 - 0.01            Labs Ordered and Resulted from Time of ED Arrival Up to the Time of Departure from the ED   ALCOHOL BREATH TEST POCT - Abnormal; Notable for the following:        Result Value    Alcohol Breath Test 0.233 (*)     All other components within normal limits   DRUG ABUSE SCREEN 6 CHEM DEP URINE ([facility])            Assessments & Plan (with Medical Decision Making)   32-year-old female with long-standing history of alcohol abuse, depressive disorder, eating disorder who presents requesting detox from alcohol.  Patient is drinking half liter of vodka per day.  Alcohol level is elevated at 0.233 patient has stigmata on exam consistent with alcohol intoxication.  Detox beds were unavailable at [facility].  Patient was not interested in detox at other institutions.  Per her request, she will be discharged with her sober fiancé and if interested she was provided with a number to contact chemical dependency intake tomorrow to check on detox bed status.    I have reviewed the nursing notes.    I have reviewed the findings, diagnosis, plan and need for follow up with the patient.    New Prescriptions    No  medications on file       Final diagnoses:   Alcohol dependence with acute alcoholic intoxication without complication (H)     Aggie PHOENIX, am serving as a trained medical scribe to document services personally performed by Jake Arce MD, based on the provider's statements to me. This document has been checked and approved by the attending provider.    Jake PHOENIX MD was physically present and have reviewed and verified the accuracy of this note documented by Aggie Conner.      1/6/2018   [facility], [place], EMERGENCY DEPARTMENT     Danial Arce MD  01/06/18 1525

## 2018-01-06 NOTE — ED AVS SNAPSHOT
[facility], Emergency Department    [address]    [place] [address]    Phone:  [phone]    Fax:  [phone]                                       Citlali Hawley   MRN: [id number]    Department:  [facility], Emergency Department   Date of Visit:  1/6/2018           Patient Information     Date Of Birth          1985        Your diagnoses for this visit were:     Alcohol dependence with acute alcoholic intoxication without complication (H)        You were seen by Danial Arce MD.      Follow-up Information     Call to follow up.    Contact information:    Call chemical dependency intake at [phone] to check on bed status        Discharge Instructions         Recovering from Addiction  Recovery means making a new life for yourself. This includes finding new interests. It involves building new relationships. It means taking better care of yourself. These will all help you replace substance use with a new and healthier life. They will also help you avoid the things that could make you want to use again.    Make lifestyle changes  A big part of recovery is changing habits. These are habits that may have led to your substance abuse. It s also a time for personal growth. Below are some changes you may want to make.    Find new activities and goals. You may want to try new hobbies and interests. Or, you may want to join an activity group to meet new people.    Build relationships. You may choose to spend more time with loved ones you lost touch with while you were using. You may also want to make new friends. And there may be some friends or family members you will not want to see because they are still using.    Exercise and eat well. Get some physical activity on most days. This can help you feel better. Eat healthy meals with lots of fruits, vegetables, and whole grains. This can also help your well-being. A nutritionist or fitness expert can help you.    Maintain ties with medical and  addiction professionals. While you may not need intense professional support, it is important to have reliable safety nets so you can access professional assistance when needed.    Relax and get enough sleep. Good sleep can help you feel better. So can less stress. Ask your counselor about relaxation exercises. These may include meditation. Also ask about stress management classes.  Date Last Reviewed: 2/1/2017 2000-2017 [employer]. [address] [place] [address]. All rights reserved. This information is not intended as a substitute for professional medical care. Always follow your healthcare professional's instructions.          Discharge References/Attachments     ALCOHOL ABUSE (ENGLISH)    ADDICTION: YOUR TREATMENT OPTIONS (ENGLISH)    ALCOHOLISM, THE IMPACT OF (ENGLISH)      24 Hour Appointment Hotline       To make an appointment at any [facility], call [phone] ([phone]). If you don't have a family doctor or clinic, we will help you find one. [facility] clinics are conveniently located to serve the needs of you and your family.             Review of your medicines      Our records show that you are taking the medicines listed below. If these are incorrect, please call your family doctor or clinic.        Dose / Directions Last dose taken    FETZIMA 40 MG 24 hr capsule   Dose:  80 mg   Quantity:  60 capsule   Generic drug:  levomilnacipran        Take 2 capsules (80 mg) by mouth daily   Refills:  1        lamoTRIgine 150 MG tablet   Commonly known as:  LaMICtal   Dose:  75 mg        Take 75 mg by mouth daily   Refills:  0        levonorgestrel 20 MCG/24HR IUD   Commonly known as:  MIRENA   Dose:  1 each        1 each (20 mcg) by Intrauterine route continuous [id number]   Refills:  0        LORAZEPAM PO        Refills:  0        potassium chloride SA 20 MEQ CR tablet   Commonly known as:  K-DUR/KLOR-CON M   Quantity:  60 tablet        TAKE 1 TABLET(20 MEQ) BY  "MOUTH TWICE DAILY   Refills:  0                Procedures and tests performed during your visit     Alcohol breath test POCT    Drug abuse screen 6 urine (chem dep)      Orders Needing Specimen Collection     None      Pending Results     Date and Time Order Name Status Description    2018 1447 Drug abuse screen 6 urine (chem dep) In process             Pending Culture Results     Date and Time Order Name Status Description    2018 1447 Drug abuse screen 6 urine (chem dep) In process             Pending Results Instructions     If you had any lab results that were not finalized at the time of your Discharge, you can call the ED Lab Result RN at [phone]. You will be contacted by this team for any positive Lab results or changes in treatment. The nurses are available 7 days a week from 10A to 6:30P.  You can leave a message 24 hours per day and they will return your call.        Thank you for choosing [facility]       Thank you for choosing [facility] for your care. Our goal is always to provide you with excellent care. Hearing back from our patients is one way we can continue to improve our services. Please take a few minutes to complete the written survey that you may receive in the mail after you visit with us. Thank you!        [employer] Information     [employer] lets you send messages to your doctor, view your test results, renew your prescriptions, schedule appointments and more. To sign up, go to [email or web address] . Click on \"Log in\" on the left side of the screen, which will take you to the Welcome page. Then click on \"Sign up Now\" on the right side of the page.     You will be asked to enter the access code listed below, as well as some personal information. Please follow the directions to create your username and password.     Your access code is: [email or web address]  Expires: 2018  9:12 PM     Your access code will  in 90 days. If you need help or a new code, please call your [facility] clinic " or [phone].        Care EveryWhere ID     This is your Care EveryWhere ID. This could be used by other organizations to access your [facility] medical records  [id number]        Equal Access to Services     FILEMON DAMICO : Cj Lackey, waadrián stern, qaybta kamichaelda stacieseferinooseas, aston charles. So [facility] [phone].    ATENCIÓN: Si habla español, tiene a mccormick disposición servicios gratuitos de asistencia lingüística. Llame al [phone].    We comply with applicable federal civil rights laws and [place] laws. We do not discriminate on the basis of race, color, national origin, age, disability, sex, sexual orientation, or gender identity.            After Visit Summary       This is your record. Keep this with you and show to your community pharmacist(s) and doctor(s) at your next visit.

## 2018-01-06 NOTE — ED NOTES
Pt attempted to get into [facility] 1 week ago for 5 week hx drinking 1 Liter Vodka daily. Pt last drink 5 minutes prior to coming in today. Pt then came into our ED last night for help but after aprox 5 hours pt left ED due to long wait time. Pt then went to [facility] detox but no beds were available. Pt in now asking for help. Strong odor of alcohol in room. Pt shaky, unsteady on feet, ABC in tact. A/OX4

## 2018-01-06 NOTE — ED AVS SNAPSHOT
[facility], Emergency Department    [address]    [facility] [address]    Phone:  [phone]    Fax:  [phone]                                       Citlali Hawley   MRN: [id number]    Department:  [facility], Emergency Department   Date of Visit:  1/6/2018           After Visit Summary Signature Page     I have received my discharge instructions, and my questions have been answered. I have discussed any challenges I see with this plan with the nurse or doctor.    ..........................................................................................................................................  Patient/Patient Representative Signature      ..........................................................................................................................................  Patient Representative Print Name and Relationship to Patient    ..................................................               ................................................  Date                                            Time    ..........................................................................................................................................  Reviewed by Signature/Title    ...................................................              ..............................................  Date                                                            Time

## 2018-01-06 NOTE — ED PROVIDER NOTES
History     Chief Complaint:  Alcohol Problem    HPI   Citlali Hawley is a 32 year old female with a medical history of bulimia, depression, alcohol use disorder, and panic disorder who presents with an alcohol problem. The patient reports that she has been drinking 1 liter of vodka per day for the last 5 weeks. The patient has been experiencing a lot of stress due several years of litigation regarding custody of her child. Patient presented to the emergency department yesterday, 01/05/18 seeking for treatment and a detox bed. Patient was given IV fluids, and a drug abuse screen was obtained which just positive for benzodiazepines. However patient left AMA prior to discharge. Patient was seen at [facility] this morning, but there were no detox bed available. She was given ginger ale during the visit. She returns to the emergency department seeking for further treatment. Patient's last drink was 1 liter of vodka half an hour ago. She states that she is going through withdrawal, with associated symptoms of tremors, nausea, and vomiting. She is also experiencing a lot of anxiety. Patient denies suicidal ideation, auditory or visual hallucinations. No previous hospitalization for alcoholism.     Allergies:  Ativan  Codeine Sulfate  Haldol  Penicillins     Medications:    Lorazepam  Lamictal  Fetzima  Mirena    Past Medical History:    Bulimia  Depression  Panic disorder  Alcohol use disorder    Past Surgical History:    Levonorgestrel IU  Leep Tx cervical  Tonsillectomy    Family History:    Depression    Social History:  Smoking status: Current some day smoker  Alcohol use: Yes   Marital Status:  Single [1]     Review of Systems   Gastrointestinal: Positive for nausea and vomiting.   Neurological: Positive for tremors.   Psychiatric/Behavioral: Positive for dysphoric mood. Negative for hallucinations, self-injury and suicidal ideas.   All other systems reviewed and are negative.    Physical Exam   Patient Vitals  for the past 24 hrs:   BP Temp Temp src Heart Rate Resp SpO2   01/06/18 1903 - - - 120 24 100 %   01/06/18 1640 - - - - - 96 %   01/06/18 1631 - - - - - 96 %   01/06/18 1630 - - - - - 96 %   01/06/18 1625 - 98.3  F (36.8  C) - - - -   01/06/18 1622 117/87 - Oral 88 20 97 %      Physical Exam  General: Adult female sitting upright. Smells of alcohol and perfume.  Eyes: PERRL, Conjunctive within normal limits. No scleral icterus, horizontal nystagmus.   ENT: Moist mucous membranes, oropharynx clear.   CV: Normal S1S2, no murmur, rub or gallop. Regular rate and rhythm  Resp: Clear to auscultation bilaterally, no wheezes, rales or rhonchi. Normal respiratory effort.  GI: Abdomen is soft, nontender and nondistended. No palpable masses. No rebound or guarding.  MSK: No edema. Nontender. Normal active range of motion.  Skin: Warm and dry. No rashes or lesions or ecchymoses on visible skin.  Neuro: Alert and oriented. Responds appropriately to all questions and commands. No focal findings appreciated. Normal muscle tone. No tremors.   Psych: Depressed mood, blunted affect.     Emergency Department Course   Laboratory:  Alcohol level blood: 0.32 ()    Interventions:  1742: Ativan 1 mg oral  1855: Ativan 2 mg oral     Emergency Department Course:  Past medical records, nursing notes, and vitals reviewed.  1627: I performed an exam of the patient and obtained history, as documented above.      1742: Patient was given the above interventions.     Findings and plan explained to the Patient. She is feeling calmer.    Patient will be transferred to [facility] via ambulance.     Impression & Plan    Medical Decision Making:  Citlali Hawley is a 32 year old female with a history of alcohol abuse who presents to the Emergency department with the desire for detox. She's acutely intoxicated here, but able to interact appropriately. She was seen here yesterday as well as [facility] today. She's had screening tests looking  at liver function, which were normal and does not appear to be suffering from any negative consequences from chronic alcohol abuse intoxication like delirium tremens or seizures. She's never had any of these complications in the past either. She may have mild withdrawal based on reported symptoms, although hemodynamically not consistent. She's appropriate for detox and a bed was found available at [facility]. They accepted her care. I discussed the plan with her and she verbalizes understanding and agreement.    Diagnosis:    ICD-10-CM    1. Acute alcoholic intoxication in alcoholism without complication (H) F10.220    2. Situational stress F43.9      Disposition:  The patient is discharged and transferred via ambulance to [facility] in stable condition.    Casandra Foster  1/6/2018   [facility] EMERGENCY DEPARTMENT  I, Casandra Foster, am serving as a scribe at 4:27 PM on 1/6/2018 to document services personally performed by Dee Knutson MD based on my observations and the provider's statements to me.       Dee Knutson MD  01/11/18 0652

## 2018-01-06 NOTE — ED NOTES
"Pt walked out with jacek, leaving AMA. Pt very agitated. Tried to redirect and talk with patient. Pt stating \"I'm leaving. I'm not waiting for 4 fucking hours. I'm leaving.\" Jacek attempted to reason with patient and keep her here. This RN tried to talk with patient. Told patient meds were in hand to give. Pt refused to stay and proceeded to walk out, jacek following.  Pt ripped out IV prior to departure. MD Kilo notified.  "

## 2018-01-06 NOTE — ED NOTES
"Pt very agitated and angry. Brought ativan to give patient for anxiety/agitation. Pt became upset saying \"I can't fucking have ativan, it makes me swell.\" Asked patient what she could have. Pt stated, \"I don't fucking know I just want to fucking feel better.\" Let patient know I would talk to MD and get an order for something else. Pt stated, \"So what I have to wait another fucking 45 minutes. I'm going to fucking leave I am sick of waiting 4 fucking hours for things.\" Told patient I was going to go talk to the MD right now and I would be back. Pt stated This is fucking ridiculous I am not waiting another 4 fucking hours.\" Fiance tried to calm patient. Attempted to reassure patient that RN would talk to MD right now and would be back. Talked with MD Kilo. Updated on situation. Waiting for new order.  "

## 2018-01-06 NOTE — ED NOTES
"Pt discharged home with SO, Patient belongings returned to patient by security. Patient declined AVS and discharge instructions. Pt states \" keep your paper , thanks for your help\".   "

## 2018-01-06 NOTE — ED AVS SNAPSHOT
[facility] Emergency Department    [address]    [facility] [address]    Phone:  [phone]    Fax:  [phone]                                       Citlali Hawley   MRN: [id number]    Department:  [facility] Emergency Department   Date of Visit:  1/6/2018           After Visit Summary Signature Page     I have received my discharge instructions, and my questions have been answered. I have discussed any challenges I see with this plan with the nurse or doctor.    ..........................................................................................................................................  Patient/Patient Representative Signature      ..........................................................................................................................................  Patient Representative Print Name and Relationship to Patient    ..................................................               ................................................  Date                                            Time    ..........................................................................................................................................  Reviewed by Signature/Title    ...................................................              ..............................................  Date                                                            Time

## 2018-01-06 NOTE — ED NOTES
"Brought pt paper scrubs and a boxed lunch, per MD okay, as pt requested.  Pt is upset, demanding meds for her \"DTs\".  MD and Rn notified.  "

## 2018-01-06 NOTE — LETTER
January 10, 2018      To Whom It May Concern:      Citlali Hawley was seen in our Emergency Department today, 01/10/18.  I expect her condition to improve.  She may return to work when improved.    Sincerely,        Danial Arce MD

## 2018-01-06 NOTE — DISCHARGE INSTRUCTIONS
Recovering from Addiction  Recovery means making a new life for yourself. This includes finding new interests. It involves building new relationships. It means taking better care of yourself. These will all help you replace substance use with a new and healthier life. They will also help you avoid the things that could make you want to use again.    Make lifestyle changes  A big part of recovery is changing habits. These are habits that may have led to your substance abuse. It s also a time for personal growth. Below are some changes you may want to make.    Find new activities and goals. You may want to try new hobbies and interests. Or, you may want to join an activity group to meet new people.    Build relationships. You may choose to spend more time with loved ones you lost touch with while you were using. You may also want to make new friends. And there may be some friends or family members you will not want to see because they are still using.    Exercise and eat well. Get some physical activity on most days. This can help you feel better. Eat healthy meals with lots of fruits, vegetables, and whole grains. This can also help your well-being. A nutritionist or fitness expert can help you.    Maintain ties with medical and addiction professionals. While you may not need intense professional support, it is important to have reliable safety nets so you can access professional assistance when needed.    Relax and get enough sleep. Good sleep can help you feel better. So can less stress. Ask your counselor about relaxation exercises. These may include meditation. Also ask about stress management classes.  Date Last Reviewed: 2/1/2017 2000-2017 [employer]. [address] [place] [address], [place] [address]. All rights reserved. This information is not intended as a substitute for professional medical care. Always follow your healthcare professional's instructions.

## 2018-01-06 NOTE — ED NOTES
Pt reports stressors and paternal grandparents want to take her children away. Pt reports drinking 1 Liter Vodka daily for 5 weeks. Denies blackouts. Pt arrives with joshua Wolf. Pt at [facility] for 2 days 1 week ago. Pt was d/c due to lack of beds. Pt has hx of anxiety, depression, bulmia, and low potassium. ABC in tact. A/OX4. Pt reports SOB and dizziness and overall body aches.

## 2018-01-07 NOTE — ED NOTES
"Pt tearful, crying. When asked what her symptoms are, pt fidgeting in bed and replies, \"It just hurts all over.\" MD mireles updated  "

## 2018-01-10 ENCOUNTER — TELEPHONE (OUTPATIENT)
Dept: PEDIATRICS | Facility: CLINIC | Age: 33
End: 2018-01-10

## 2018-01-18 ENCOUNTER — HOSPITAL ENCOUNTER (OUTPATIENT)
Dept: BEHAVIORAL HEALTH | Facility: CLINIC | Age: 33
Discharge: HOME | End: 2018-01-18
Attending: SOCIAL WORKER | Admitting: SOCIAL WORKER
Payer: COMMERCIAL

## 2018-01-18 VITALS
BODY MASS INDEX: 20.37 KG/M2 | HEART RATE: 57 BPM | WEIGHT: 129.8 LBS | HEIGHT: 67 IN | SYSTOLIC BLOOD PRESSURE: 91 MMHG | DIASTOLIC BLOOD PRESSURE: 75 MMHG

## 2018-01-18 PROCEDURE — H0001 ALCOHOL AND/OR DRUG ASSESS: HCPCS

## 2018-01-18 ASSESSMENT — ANXIETY QUESTIONNAIRES
3. WORRYING TOO MUCH ABOUT DIFFERENT THINGS: SEVERAL DAYS
7. FEELING AFRAID AS IF SOMETHING AWFUL MIGHT HAPPEN: SEVERAL DAYS
2. NOT BEING ABLE TO STOP OR CONTROL WORRYING: SEVERAL DAYS
4. TROUBLE RELAXING: SEVERAL DAYS
5. BEING SO RESTLESS THAT IT IS HARD TO SIT STILL: SEVERAL DAYS
6. BECOMING EASILY ANNOYED OR IRRITABLE: SEVERAL DAYS
1. FEELING NERVOUS, ANXIOUS, OR ON EDGE: SEVERAL DAYS
GAD7 TOTAL SCORE: 7

## 2018-01-18 ASSESSMENT — PATIENT HEALTH QUESTIONNAIRE - PHQ9: SUM OF ALL RESPONSES TO PHQ QUESTIONS 1-9: 7

## 2018-01-18 ASSESSMENT — PAIN SCALES - GENERAL: PAINLEVEL: NO PAIN (0)

## 2018-01-18 NOTE — PROGRESS NOTES
"CHEMICAL DEPENDENCY ASSESSMENT SUMMARY    PATIENT NAME: Citlali Hawley  MEDICAL RECORD NUMBER: [id number]  PATIENT ADDRESS: [address] [place] [id number]  HOME TELEPHONE NUMBER: [phone] (home)   STATISTICS: YOB: 1985     Age: 32 year old     Gender: female    RELATIONSHIP STATUS:  Single, in a serious relationship    DATE OF ASSESSMENT: 01/18/2018  EVALUATION COUNSELOR: Carmen Prado    REFERRAL SOURCE: Self    REASON FOR EVALUATION:     On 01/18/2018, Ms. Hawley presented to [facility] for a Substance Use Evaluation. Patient reported she is here because, \"I am an alcoholic.\" She went through [facility] detox in early 01/2018 because she was drinking a liter of vodka per day. She got out of detox and started drinking again. She went to detox on 01/09/2018 for one night. She hasn't drank since.     HEALTH HISTORY AND MEDICATIONS:     Patient reported she has very low blood pressure. She is allergic to penicillin and Haldol. Patient reported that she is prescribed Lamictal 100mg, Fetzima 80mg, Potassium, Lorazepam, and Adderall. Patient denied having any chronic biomedical conditions that would interfere with treatment. Patient denied having pain. Patient has a primary care clinic and is able to seek services as needed. Patient would benefit from following all of the recommendations of medical providers.     HISTORY OF PREVIOUS TREATMENT AND COUNSELING:     10 years ago at [facility] for Tramadol addiction.     HISTORY OF ALCOHOL AND DRUG USE:     - Alcohol - For past 2 years - she daily drank 12 to 24 beers during the dinner hour.  About 1.5 months ago - she started drinking a bottle of vodka. If she had her kids, she started drinking after they went to bed. If not have her children, she started drinking in the morning before work. LAST USE: 01/10/18, a liter of vodka. Prior to 2 years ago - she was drinking once a month.   - Adderall - She has been prescribed Adderall " "30mg BID since elementary school. She said she only takes it while reading, so approximately once a month. She denied abuse. LAST USE: Early 01/2018.  - Benzodiazepine - Lorazepam 0.5mg - Summer 2017 - prescribed. She takes 1 pill or 1/2 of a pill as needed. She took it daily in summer but has only taken it once in the past month. She denied abuse. LAST USE: 01/03/18, 1 pill    SUMMARY OF SUBSTANCE USE DISORDER SYMPTOMS ACKNOWLEDGED BY THE PATIENT: The patient identified positively with 11 of the 11 DSM-5 criteria for a primary diagnostic impression of substance use disorder severe.     SUMMARY OF COLLATERAL DATA:    - On 01/18/2018, Therapist called and left voicemail for Ms. Hernandez. On 01/22/2018, Therapist spoke with Ms. Hernandez. She believes patient drinks daily and drinks a lot. Patient has many difficulties over the past 7 years. Ms. Hernandez thinks a treatment that has mental health counseling and is longer than 30 days would benefit patient most. Patient has struggled with bulimia off and on. Ms. Hernandez thinks patient abuses her Adderall because she will take more so she will not be hungry. Patient was diagnosed with ADD in high school, but Adderall seems to make patient jittery now, instead of helping. Ms. Hernandez wonders if patient is prescribed the right medications. Patient will take her medications and be fine, but then stop taking them. Ms. Hernandez is concerned about patient's safety because patient recklessly drinks large amounts.   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 Remote history of treatment for CATHRYN involving tramadol. She indicates alcohol has been problematic in past month, but has history going back at least 3-4 years per records. Pt is drinking 1 liter of vodka daily for the past 4 weeks. She denies any other " "substance use. Pt has a history of bulimia nervosa, but denies recent/current symptom use. Multiple anxiety and depression diagnoses, and has not taken any of her PTA medications for the past month. Indicates all medical and psychiatric medication currently per provider in [place]. 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 Had been working at [employer] but indicates she stopped working in part due to stress of children's paternal grandparents calling her at work. She is unclear if she wants to resume work in nursing. [facility] and encouraged to self-report. Legal status complicated, including she indicates active custody litigation with above grandparents. She indicates she has full custody of the 12 year-old, shared of 6 year-old. Also question of recent contempt of court warrant in another state. She indicates mother currently caring for the two daughters. This morning experiences sweats, chills, anxiety. Wants treatment but unclear if LP or something else possibly closer to [place] due to children.  Is admitted voluntarily.\"  \"Alcohol withdrawal managed via MSSA with diazepam. Unfortunately, patient quickly became increasingly labile and demanding. At first to restart psychiatric medications she had not taken in over a month PTA, and with no currently psychiatric follow-up in place. Then insisting she needed diazepam when RN assessment indicated otherwise. She was offered several options to manage anxiety, which she declined. She finally insisted upon discharge even though still in active detoxification status and prior to treatment planning complete. 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     [facility] PLACEMENT CRITERIA:    DIMENSION 1: Intoxication and Withdrawal:  The patient scored a 2.    Patient does not appear at risk of having significant withdrawal symptoms at this time. Patient denied current feelings of withdrawal. Patient reports that her last use of alcohol was on 01/10/2018. Patient was administered a breathalyzer during the evaluation and the DAVID was 0.00. Patient was also administered an urinalysis during the evaluation and the UA was positive for benzodiazepines, which could be consistent with the recent detox admission. If patient resumes drinking, patient may need detox before treatment. At the time of the Substance Use Evaluation the patient s blood pressure was 91/75 and pulse was 57 BPM.     DIMENSION 2: Biomedical Conditions:  The patient scored a 1.    See health history above.     DIMENSION 3: Emotional and Behavioral:  The patient scored a 2.    Patient was diagnosed with ADD in elementary and she still gets tested for it. She also reported diagnoses of depression, anxiety and bipolar. Patient reported that she is prescribed Lamictal 100mg, Fetzima 80mg, Lorazepam, and Adderall. Patient s PHQ-9 score was 7 out of 27, indicating mild depression. Patient s JUANITO-7 score was 7 out of 21, indicating mild anxiety. Patient denied suicidal and self-injurious ideation and intent at this time. Patient one suicide attempt in the past. Patient reported a history of emotional abuse. Patient would benefit from beginning individual mental health therapy.     DIMENSION 4: Readiness to Change:  The patient scored a 3.    Patient expressed a desire abstain from alcohol and stated she presents on her own accord. She denied concerns with other substances, but denied her doctor knows about her drinking. Patient also appears to minimize her mental health concerns. She denied having an eating disorder; however, collaterals reported patient has a history.      DIMENSION 5: Relapse Potential:   The patient scored a 4.    Patient reported one past treatment and no support group attendance. Patient reported having minimal sober time. Patient has tried to quit drinking in the past but relapsed. Patient lacks knowledge of the addiction cycle. She lacks insight into her personal relapse process along with warning signs and triggers. Patient lacks impulse control, sober coping skills, and long-term sober maintenance skills. Patient is at a high risk for relapse/continued use. Patient has co-occurring mental health and substance use issues, which places her at higher risk for relapse.     DIMENSION 6: Recovery Environment:  The patient scored a 3.    Patient lives alone and has her daughters full or part-time. Her current living situation is unsupportive towards recovery, as most of her use of alcohol has been done alone and it is easy to access alcohol. Patient reported having relationship conflict with her family due to her ongoing substance use. Patient is in a significant relationship. Patient lacks a current sober support network familiar with recovery. Patient is employed part-time, but on a leave of absence for treatment. She lacks a daily structure and meaningful activities. Patient reported legal issues of DUI and child custody violations in 2017. Patient was unsure if she was on probation.     RECOMMENDATIONS:    It is recommended that patient:  1). Participate in and complete a women's co-occurring lodging/residential substance use treatment program at [facility].   2). Follow all subsequent recommendations of the substance use treatment providers.   3). Abstain from alcohol and all mood-altering substances, except as prescribed. Take all medications as prescribed.   4). Attend AA at least three times weekly and obtain a female sponsor for additional sober supports. Consider attending [facility] to address effects of alcohol from family of origin.  5). Become involved in a daily sober recreational  activity/hobby of her own interest.  6). Have a mental health evaluation to determine accurate diagnoses and if different psychotropic medications would be effective.   7). Begin weekly individual mental health therapy with a trauma-informed therapist.    INITIAL PROBLEM LIST:    The patient lacks relapse prevention skills  The patient has poor coping skills  The patient has poor refusal skills   The patient lacks a sober peer support network  The patient has a tendency to isolate  The patient has dual issues of MI and CD  The patient lacks the ability to effectively manage his/her mental health issues  The patient has a significant history of trauma and/or abuse issues  The patient has current legal issues      This information has been disclosed to you from records protected by Federal confidentiality rules (42 CFR part 2). The Federal rules prohibit you from making any further disclosure of this information unless further disclosure is expressly permitted by the written consent of the person to whom it pertains or as otherwise permitted by 42 CFR part 2. A general authorization for the release of medical or other information is NOT sufficient for this purpose. The Federal rules restrict any use of the information to criminally investigate or prosecute any alcohol or drug abuse patient.

## 2018-01-18 NOTE — PROGRESS NOTES
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 DESCRIPTIONS - Severity rating: 3 Client displays inconsistent compliance, minimal awareness of either the client s addiction or mental disorder, and is minimally cooperative.    REASONS SEVERITY WAS ASSIGNED - (What information did the person provide that supports your assessment of his or her readiness to change? How aware is the person of problems caused by continued use? How willing is she or he to make changes? What does the person feel would be helpful? What has the person been able to do without help?)      Patient expressed a desire abstain from alcohol and stated she presents on her own accord. She denied concerns with other substances, but denied her doctor knows about her drinking. Patient also appears to minimize her mental health concerns. She denied having an eating disorder; however, collaterals reported patient has a history.      DIMENSION V - Relapse, Continued Use and Continued Problem Potential     1. If you have had previous periods of sobriety, when was your longest period of sobriety and what were you doing at that time that was supporting your sobriety?  (DSM-5: Sx #2)    She has tried sobriety and her family was helpful with their support.     2. Within the past 30 days, on a scale from 0-10 " (0 = having no cravings at all and 10 = having very strong cravings to use alcohol or other drugs) what number would you assign to your cravings? (DSM-5: Sx #4)     Very strong cravings in the past 2 days    3. Can you identify any specific reasons or specific triggers that contribute to you being more likely to consume alcohol or other drugs? (DSM-5: Sx #4)    Yes, explain: stress of dealing with court system.     4. Have you been treated for alcohol/other substance use disorder? (DSM-5: Sx #2)    Yes - 10 years ago at [facility] for Tramadol addiction.     5. Support group participation: Have you/do you attend 12-step or other support group meetings? How recently? What was your experience? Are you willing to restart? If the person has not participated, is he or she willing?  (DSM-5: Sx #2)    She hasn't gone to  in years.     6. Do you drink alcohol or use other drugs in larger amounts than intended or over a longer period of time than was intended?  (DSM-5: Sx #1)    Yes, explain: especially starting 1.5 months ago.     7. Do you spend a great deal of time engaged in activities necessary to obtain alcohol or other drugs, a great deal of time using alcohol or other drugs, or a great deal of time recovering from alcohol or other drug use?  (DSM-5: Sx #3)    Yes, explain: Sometimes she will stop to get alcohol before work.     Dimension V Ratings Summary   Relapse/Continued Use/Continued problem potential - The placing authority must use the criteria in Dimension V to determine a client s relapse, continued use, and continued problem potential.   RISK DESCRIPTIONS - Severity ratin No awareness of the negative impact of mental health problems or substance abuse. No coping skills to arrest mental health or addiction illnesses, or prevent relapse.    REASONS SEVERITY WAS ASSIGNED - (What information did the person provide that indicates his or her understanding of relapse issues? What about the person s experience  "indicates how prone he or she is to relapse? What coping skills does the person have that decrease relapse potential?)      Patient reported one past treatment and no support group attendance. Patient reported having minimal sober time. Patient has tried to quit drinking in the past but relapsed. Patient lacks knowledge of the addiction cycle. She lacks insight into her personal relapse process along with warning signs and triggers. Patient lacks impulse control, sober coping skills, and long-term sober maintenance skills. Patient is at a high risk for relapse/continued use. Patient has co-occurring mental health and substance use issues, which places her at higher risk for relapse.      DIMENSION VI - Recovery Environment     1. Are you employed or attending school?    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 Hobbies: \"I don't know.\"         2. If working or a student, are you able to function appropriately in that setting?     Yes    3. Has your job and/or school work been negatively impacted by your use of alcohol of other drugs?  (DSM-5: Sx #5 & Sx #7)    No, Patient denied that use has negatively impacted her work, but reported she has been hungover at work and she has drank before going to work. Her work knows about her drinking and want her to take a leave to get better.     4. How would you describe your current finances?  Some money problems     5. Are you having financial problems, such as money being tight, living paycheck to paycheck, having unpaid or late bills, having significant debt, a history of bankruptcy, or IRS problems?    Yes, please explain: her court situation is taking lots of money     6. Describe a typical day; evening for you. Work, school, social, leisure activities, volunteer, exercise, spiritual practices or other daily tasks.    - When working: wake up at 4:30am, work from 5am until 2pm. After work, if she had her daughter, she didn't drink until her " "after her daughter went to bed. If she didn't have her daughter, she went to the liquor store, drank, and then went to bed.   - Since not working for the past week - \"It's been erratic.\"     7. Have you reduced or discontinued recreational activities, hobbies or other leisure activities as a result of your use of alcohol or other drugs?  (DSM-5: Sx #7)    Yes, If yes explain: Patient reported that use has prevented her from completing daily tasks.    8. Who do you live with?      She lives alone. She has her 12 year old daughter 100% of the time. She has her 6 year old daughter 50% of the time. Patient's mother is helping by watching both daughters.     9. Are there any people in the home who have current substance abuse issues or have mental health issues?     NA    10. Tell me about your living environment/neighborhood? Ask enough follow up questions to determine safety, criminal activity, availability of alcohol and drugs, supportive or antagonistic to the person making changes.      Patient reported her neighborhood is safe. Patient reported it is easy to get alcohol because it is across the street.     11. Are you concerned for your safety or anyone else's safety in the home? No    12. Do you have plans to move somewhere else or change your living environment in any manner?    Patient plans to remain indefinitely.    13. Do you have children who live with you?     She has her 12 year old daughter 100% of the time. She has her 6 year old daughter 50% of the time.     14. Do you have children who do not live with you?     See above    15. Do you have any history of being involved with Child Protection Services? No      16. Are you currently in a significant relationship?     Yes, if yes: She is engaged and has been dating him for 1 year.     17. How do you identify your sexual orientation?    Heterosexual    18. The patient reported: being single, in a serious relationship.    19. Does your significant other have " "a history of substance abuse or have current substance abuse issues?    No    20. How important is substance use to your social connections? Do many of your family or friends use?     She does isolated drinking.    21. Who in your life would you consider to be your primary support network at this time?    Sister, mom, dad, fiancee.     22. Have any of your relationships (S.O., family members, friends, employers, teachers, etc.) been negatively impacted by your use of alcohol or other drugs?  (DSM-5: Sx #6)    Yes, If yes explain: Her family is concerned and want her to stop.     23. Do you currently participate in community karlo activities, such as attending Uatsdin, temple, Zoroastrianism or Alevism services?  No    24. Criminal justice history: Gather current/recent history and any significant history related to substance use--Arrests? Convictions? Circumstances? Alcohol or drug involvement? Sentences? Still on probation or parole? Expectations of the court? Current court order?  (DSM-5: Sx #8)    - DUI - 02/2017. Patient's 's license is currently active. Patient stated she might be on probation for the DUI, but doesn't think so.    - She stated she has \"two felonies for grandparent violation\" from last summer in [place].    25. Are you or have you ever been a registered sex offender? No    26. Do you have a child protection worker, probation office or ? No    27. Do you have a valid 's license?  Yes     28. What obstacles exist to participating in treatment? (Time off work, childcare, funding, transportation, pending care home time, living situation)     None    Dimension VI Ratings Summary   Recovery environment - The placing authority must use the criteria in Dimension VI to determine a client s recovery environment.   RISK DESCRIPTIONS - Severity rating: 3 Client is not engaged in structured, meaningful activity and the client s peers, family, significant other, and living environment are " unsupportive, or there is significant criminal justice system involvement.    REASONS SEVERITY WAS ASSIGNED - (What support does the person have for making changes? What structure/stability does the person have in his or her daily life that will increase the likelihood that changes can be sustained? What problems exist in the person s environment that will jeopardize getting/staying clean and sober?)     Patient lives alone and has her daughters full or part-time. Her current living situation is unsupportive towards recovery, as most of her use of alcohol has been done alone and it is easy to access alcohol. Patient reported having relationship conflict with her family due to her ongoing substance use. Patient is in a significant relationship. Patient lacks a current sober support network familiar with recovery. Patient is employed part-time, but on a leave of absence for treatment. She lacks a daily structure and meaningful activities. Patient reported legal issues of DUI and child custody violations in 2017. Patient was unsure if she was on probation.      Mental Health Status   Physical Appearance/Attire: Appears older than stated age   Hygiene: adequately groomed well groomed   Eye Contact: at examiner   Speech Rate:  regular   Speech Volume: regular   Speech Quality: fluid   Cognitive/Perceptual:  reality based   Cognition: memory intact    Judgment: intact   Insight: intact   Orientation:  time, place, person and situation   Thought:   logical    Hallucinations:  none   General Behavioral Tone: cooperative   Psychomotor Activity: no problem noted   Gait:  no problem   Mood: sad and depressed   Affect: congruence/appropriate   Counselor Notes: NA     Patient Choices/Exceptions     Would you like services specific to language, age, gender, culture, Hindu preference, race, ethnicity, sexual orientation or disability?  No    What particular treatment choices and options would you like to have? Inpatient  treatment.     Do you have a preference for a particular treatment program? [facility].     Patient is willing to follow treatment recommendations.  Yes    DSM-5 Criteria for Substance Use Disorder   Criteria for Diagnosis  Instructions: Determine whether the client currently meets the criteria for Substance Use Disorder using the diagnostic criteria in the DSM-5 pp.[id number]. Current means during the most recent 12 months outside a facility that controls access to substances.    A problematic pattern of alcohol/drug use leading to clinically significant impairment or distress, as manifested by at least two of the following, occurring within a 12-month period: 11/11    1. Alcohol/drug is often taken in larger amounts or over a longer period than was intended.  2. There is a persistent desire or unsuccessful efforts to cut down or control alcohol/drug use  3. A great deal of time is spent in activities necessary to obtain alcohol/drug, use alcohol/drug, or recover from its effects.  4. Craving, or a strong desire or urge to use alcohol/drug  5. Recurrent alcohol/drug use resulting in a failure to fulfill major role obligations at work, school or home.  6. Continued alcohol use despite having persistent or recurrent social or interpersonal problems caused or exacerbated by the effects of alcohol/drug.  7. Important social, occupational, or recreational activities are given up or reduced because of alcohol/drug use.  8. Recurrent alcohol/drug use in situations in which it is physically hazardous.  9. Alcohol/drug use is continued despite knowledge of having a persistent or recurrent physical or psychological problem that is likely to have been caused or exacerbated by alcohol.  10. Tolerance, as defined by either of the following: A need for markedly increased amounts of alcohol/drug to achieve intoxication or desired effect.  11. Withdrawal, as manifested by either of the following: The characteristic withdrawal  syndrome for alcohol/drug (refer to Criteria A and B of the criteria set for alcohol/drug withdrawal).      Specify if: In early remission:  After full criteria for alcohol/drug use disorder were previously met, none of the criteria for alcohol/drug use disorder have been met for at least 3 months but for less than 12 months (with the exception that Criterion A4,  Craving or a strong desire or urge to use alcohol/drug  may be met).     In sustained remission:   After full criteria for alcohol use disorder were previously met, non of the criteria for alcohol/drug use disorder have been met at any time during a period of 12 months or longer (with the exception that Criterion A4,  Craving or strong desire or urge to use alcohol/drug  may be met).   Specify if:   This additional specifier is used if the individual is in an environment where access to alcohol is restricted.    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     Collateral Contact Summary     Number of contacts made:  1    Contact with referring person:  NA    If court related records were reviewed, summarize here:  NA    Collateral Contact      Name: Relationship: Phone number: Releases:   Dee Hawley Sister [phone] Yes     On 01/18/2018, Therapist called Ms. Hawley, but the phone was cutting out. Therapist called back and no answer.     Collateral Contact      Name: Relationship: Phone number: Releases:   Loyd Hernandez Sister [phone] Yes     On 01/18/2018, Therapist called and left voicemail for Ms. Hernandez. On 01/22/2018, Therapist spoke with Ms. Hernandez. She  "believes patient drinks daily and drinks a lot. Patient has many difficulties over the past 7 years. Ms. Hernandez thinks a treatment that has mental health counseling and is longer than 30 days would benefit patient most. Patient has struggled with bulimia off and on. Ms. Hernandez thinks patient abuses her Adderall because she will take more so she will not be hungry. Patient was diagnosed with ADD in high school, but Adderall seems to make patient jittery now, instead of helping. Ms. Hernandez wonders if patient is prescribed the right medications. Patient will take her medications and be fine, but then stop taking them. Ms. Hernandez is concerned about patient's safety because patient recklessly drinks large amounts.     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 Remote history of treatment for CATHRYN involving tramadol. She indicates alcohol has been problematic in past month, but has history going back at least 3-4 years per records. Pt is drinking 1 liter of vodka daily for the past 4 weeks. She denies any other substance use. Pt has a history of bulimia nervosa, but denies recent/current symptom use. Multiple anxiety and depression diagnoses, and has not taken any of her PTA medications for the past month. Indicates all medical and psychiatric medication currently per provider in [place]. 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 Had been working at [employer] but indicates she stopped working in part due to stress of children's paternal grandparents calling her at work. She is unclear if she wants to resume work in nursing. [facility] and encouraged to self-report. Legal status complicated, including she indicates active " "custody litigation with above grandparents. She indicates she has full custody of the 12 year-old, shared of 6 year-old. Also question of recent contempt of court warrant in another state. She indicates mother currently caring for the two daughters. This morning experiences sweats, chills, anxiety. Wants treatment but unclear if LP or something else possibly closer to [place] due to children.  Is admitted voluntarily.\"  \"Alcohol withdrawal managed via MSSA with diazepam. Unfortunately, patient quickly became increasingly labile and demanding. At first to restart psychiatric medications she had not taken in over a month PTA, and with no currently psychiatric follow-up in place. Then insisting she needed diazepam when RN assessment indicated otherwise. She was offered several options to manage anxiety, which she declined. She finally insisted upon discharge even though still in active detoxification status and prior to treatment planning complete. She was not holdable, so discharge granted but is AMA.\"    Recommendations     It is recommended that patient:  1). Participate in and complete a women's co-occurring lodging/residential substance use treatment program at [facility].   2). Follow all subsequent recommendations of the substance use treatment providers.   3). Abstain from alcohol and all mood-altering substances, except as prescribed. Take all medications as prescribed.   4). Attend AA at least three times weekly and obtain a female sponsor for additional sober supports. Consider attending [facility] to address effects of alcohol from family of origin.  5). Become involved in a daily sober recreational activity/hobby of her own interest.  6). Have a mental health evaluation to determine accurate diagnoses and if different psychotropic medications would be effective.   7). Begin weekly individual mental health therapy with a trauma-informed therapist.    Level of Care   I.) Intoxication and Withdrawal: 2 "   II.) Biomedical:  1   III.) Emotional and Behavioral:  2   IV.) Readiness to Change:  3   V.) Relapse Potential: 4   VI.) Recovery Environmental: 3     Initial Problem List     The patient lacks relapse prevention skills  The patient has poor coping skills  The patient has poor refusal skills   The patient lacks a sober peer support network  The patient has a tendency to isolate  The patient has dual issues of MI and CD  The patient lacks the ability to effectively manage his/her mental health issues  The patient has a significant history of trauma and/or abuse issues  The patient has current legal issues

## 2018-01-19 ASSESSMENT — ANXIETY QUESTIONNAIRES: GAD7 TOTAL SCORE: 7

## 2018-07-14 ENCOUNTER — HEALTH MAINTENANCE LETTER (OUTPATIENT)
Age: 33
End: 2018-07-14

## 2018-08-18 ENCOUNTER — HEALTH MAINTENANCE LETTER (OUTPATIENT)
Age: 33
End: 2018-08-18

## 2021-05-02 NOTE — ED PROVIDER NOTES
History     Chief Complaint:  Alcohol problem    HPI   Citlali Hawley is a 32 year old female who presents with an alcohol problem. The patient and her fiancee report that has a longstanding history of alcohol abuse and typically drinks 1 liter of vodka per day. She was recently at [facility] for similar concerns waiting for treatment of her alcohol abuse, however she states that she was kicked out after becoming aggressive with staff there. (Upon reviewing records she left AMA)  Upon arrival here in the ED she states her last drink was approximately one hour prior to arrival. She reports that she has been experiencing several days of symptoms including tremors, nausea, and vomiting since attempting to quit drinking. She would like help with this and is seeking treatment. She has never been to treatment for alcohol abuse, though notes she was in treatment for a controlled substance 12 years ago.   The patient and her fiance report that she has been experiencing a significant amount of stress following several years of litigation regarding custody of her child. She has an upcoming court date regarding this as well. She reports that she would never harm herself or attempt to end her life due to her children however. The patient has also not been taking her prescribed medications for approximately one month now.    Allergies:  Codeine sulfate  Haldol  Penicillins     Medications:    Ativan  K-Dur   Lamictal  Fetzima  Mirena    Past Medical History:    ASCUS on pap smear  Bulimia  XIANG  Depression  Panic disorder    Past Surgical History:    Colposcopy cervix, loop electrode biopsy  Levonorgestrel IU  Laparoscopy  LEEP TC, cervical  Tonsillectomy    Family History:    Depression    Social History:  The patient was accompanied to the ED by her fiance.  Smoking Status: Yes  Smokeless Tobacco: Former  Alcohol Use: Yes  Marital Status:  Single     Review of Systems   Gastrointestinal: Positive for diarrhea, nausea and  No "vomiting. Negative for anal bleeding and blood in stool.   Psychiatric/Behavioral: Positive for agitation. Negative for hallucinations, self-injury and suicidal ideas. The patient is nervous/anxious.    All other systems reviewed and are negative.    Physical Exam   Vitals:  Patient Vitals for the past 24 hrs:   BP Temp Temp src Heart Rate Resp SpO2 Height Weight   01/05/18 2215 - - - 105 - 96 % - -   01/05/18 2115 - - - - - 97 % - -   01/05/18 2054 - - - - - 96 % - -   01/05/18 1945 139/87 - - - - - - -   01/05/18 1943 - 98.4  F (36.9  C) Temporal 117 18 97 % 1.702 m (5' 7\") 59 kg (130 lb)     Physical Exam    Nursing note and vitals reviewed.    Constitutional: Disheveled, tearful         HENT:    Mouth/Throat: Oropharynx is without swelling or erythema. Oral mucosa moist.    Eyes: Conjunctivae are normal. No scleral icterus.    Neck: Neck supple.   Cardiovascular: Tachycardic rate, regular rhythm and intact distal pulses.    Pulmonary/Chest: Effort normal and breath sounds normal.   Abdominal: Soft.  No distension. There is no tenderness.   Musculoskeletal:  No edema, No calf tenderness  Neurological:Alert and oriented. Coordination normal. Cranial nerves II-XII intact. Normal, steady gait.   Skin: Skin is warm and dry.   Psychiatric: intermittently agitated vs. tearfu        Emergency Department Course     Laboratory:  Laboratory findings were communicated with the patient who voiced understanding of the findings.  Alcohol ethyl (collected at 2212): 0.22 (H)  CBC: AWNL. (WBC 4.8, HGB 12.9, )   CMP: Chloride: 110 (H), Calcium: 8.3 (L) o/w WNL (Creatinine 0.85)  HCG Qual: Negative  Drug abuse screen: Benzodiazepine Qual: Positive     Interventions:  2218 Zofran, 4 mg, IV  2218 Normal Saline 1000 mL IV     Emergency Department Course:  Nursing notes and vitals reviewed.  2100 I had my initial encounter with the patient.  I performed an exam of the patient as documented above.   2134 I spoke with DEC after " they evaluated the patient here in the ED. DEC worker spoke with Virginie they would not admit the patient.   2215: RN informed me that the patient stated she was feeling very shaky and anxious. Ativan ordered  2235: RN informed me that patient states she is allergic to ativan stating it caused throat swelling. Zyprexa ordered. Ativan added to allergies by RN      The patient left AMA with her fiance before they could be discharged from the ED    Impression & Plan      Medical Decision Making:  This patient presented to the ED seeking assistance with her alcohol dependence. She is here with her fiance She denies suicidal or homicidal ideation. ED evaluation is above. She is not demonstrating signs of alcohol withdrawal but rather is clinically intoxicated. DEC graciously assisted in helping us find detox beds as the patient stated willingness to go to detox. During this time the patient became very agitated and impatient. Refer to RN charting. She eloped prior to discharge with danitza. At this time, as she has a reliable adult to supervise her I do not believe it is appropriate to place her on a hold. I am uncertain if she received treatment and detox resources prior to eloping.     Diagnosis:    ICD-10-CM    1. Acute alcoholic intoxication in alcoholism without complication (H) F10.220        Disposition:   The patient left with her fiance prior to decision to discharge.     Scribe Disclosure:  I, Ricardo Jaffe, am serving as a scribe at 9:00 PM on 1/5/2018 to document services personally performed by Ernestine Boateng*, based on my observations and the provider's statements to me.    1/5/2018   [facility] EMERGENCY DEPARTMENT       Ernestine Boateng MD  01/07/18 2117

## 2022-08-28 NOTE — ED AVS SNAPSHOT
[facility] Emergency Department    [address] [place] [address]    Phone:  [phone]    Fax:  [phone]                                       Citlali Hawley   MRN: [id number]    Department:  [facility] Emergency Department   Date of Visit:  5/27/2017           Patient Information     Date Of Birth          1985        Your diagnoses for this visit were:     Anxiety     Hypokalemia        You were seen by Ernestine Boateng MD.      Follow-up Information     Follow up with Serina David MD In 3 days.    Specialties:  Internal Medicine, Pediatrics    Contact information:    [place] [facility] [address] [place] [id number] [phone]          Discharge Instructions         Understanding Anxiety Disorders  Almost everyone gets nervous now and then. It s normal to have knots in your stomach before a test, or for your heart to race on a first date. But an anxiety disorder is much more than a case of nerves. In fact, its symptoms may be overwhelming. But treatment can relieve many of these symptoms. Talking to your doctor is the first step.    What are anxiety disorders?  An anxiety disorder causes intense feelings of panic and fear. These feelings may arise for no apparent reason. And they tend to recur again and again. They may prevent you from coping with life and cause you great distress. As a result, you may avoid anything that triggers your fear. In extreme cases, you may never leave the house. Anxiety disorders may cause other symptoms, such as:    Obsessive thoughts you can t control    Constant nightmares or painful thoughts of the past    Nausea, sweating, and muscle tension    Difficulty sleeping or concentrating  What causes anxiety disorders?  Anxiety disorders tend to run in families. For some people, childhood abuse or neglect may play a role. For others, stressful life events or trauma may trigger anxiety  disorders. Anxiety can trigger low self-esteem and poor coping skills.  Common anxiety disorders    Panic disorder: This causes an intense fear of being in danger.    Phobias: These are extreme fears of certain objects, places, or events.    Obsessive-compulsive disorder: This causes you to have unwanted thoughts. You also may perform certain actions over and over.    Posttraumatic stress disorder: This occurs in people who have survived a terrible ordeal. It can cause nightmares and flashbacks about the event.    Generalized anxiety disorder: This causes constant worry that can greatly disrupt your life.   Getting better  You may believe that nothing can help you. Or, you might fear what others may think. But most anxiety symptoms can be eased. Having an anxiety disorder is nothing to be ashamed of. Most people do best with treatment that combines medication and therapy. Although these aren t cures, they can help you live a healthier life.    6016-2810 [employer]. [address] [place] [address]. All rights reserved. This information is not intended as a substitute for professional medical care. Always follow your healthcare professional's instructions.          Treating Anxiety Disorders with Therapy  If you have an anxiety disorder, you don t have to suffer anymore. Treatment is available. Therapy (also called counseling) is often a helpful treatment for anxiety disorders. With therapy, a specially trained professional (therapist) helps you face and learn to manage your anxiety. Therapy can be short-term or long-term depending on your needs. In some cases, medication may also be prescribed with therapy. It may take time before you notice how much therapy is helping, but stick with it. With therapy, you can feel better.    Cognitive behavioral therapy (CBT)  Cognitive behavioral therapy (CBT) teaches you to manage anxiety. It does this by helping you understand how you think and act when  you re anxious. Research has shown CBT to be a very effective treatment for anxiety disorders. How CBT is run is almost like a class. It involves homework and activities to build skills that teach you to cope with anxiety step by step. It can be done in a group or one-on-one, and often takes place for a set number of sessions. CBT has two main parts:    Cognitive therapy helps you identify the negative, irrational thoughts that occur with your anxiety. You ll learn to replace these with more positive, realistic thoughts.    Behavioral therapy helps you change how you react to anxiety. You ll learn coping skills and methods for relaxing to help you better deal with anxiety.  Other forms of therapy  Other therapy methods may work better for you than CBT. Or, you may move from CBT to another form of therapy as your treatment needs change. This may mean meeting with a therapist by yourself or in a group. Therapy can also help you work through problems in your life, such as drug or alcohol dependence, that may be making your anxiety worse.  Getting better takes time  Therapy will help you feel better and teach you skills to help manage anxiety long term. But change doesn t happen right away. It takes a commitment from you. And treatment only works if you learn to face the causes of your anxiety. So, you might feel worse before you feel better. This can sometimes make it hard to stick with it. But remember: Therapy is a very effective treatment. The results will be well worth it.  Helping yourself  If anxiety is wearing you down, here are some things you can do to cope:    Don t fight your feelings. Anxiety feeds itself. The more you worry about it, the worse it gets. Instead, try to identify what might have triggered your anxiety. Then try to put this threat in perspective.    Keep in mind that you can t control everything about a situation. Change what you can and let the rest take its course.    Exercise -- it s a  great way to relieve tension and help your body feel relaxed.    Examine your life for stress, and try to find ways to reduce it.    Avoid caffeine and nicotine, which can make anxiety symptoms worse.    Fight the temptation to turn to alcohol or unprescribed drugs for relief. They only make things worse in the long run.     4002-6338 [employer]. [address] [place] [address]. All rights reserved. This information is not intended as a substitute for professional medical care. Always follow your healthcare professional's instructions.          Discharge References/Attachments     DIET, HIGH POTASSIUM (ENGLISH)      Future Appointments        Provider Department Dept Phone Center    5/30/2017 4:30 PM ASHLIE Rider CNP [facility] [phone] [facility]    6/27/2017 12:00 PM Serina David MD [facility] [phone] [facility]      24 Hour Appointment Hotline       To make an appointment at any [facility], call [phone] ([phone]). If you don't have a family doctor or clinic, we will help you find one. [facility] are conveniently located to serve the needs of you and your family.             Review of your medicines      START taking        Dose / Directions Last dose taken    potassium chloride SA 20 MEQ CR tablet   Commonly known as:  potassium chloride   Dose:  20 mEq   Quantity:  10 tablet        Take 1 tablet (20 mEq) by mouth 2 times daily for 5 days   Refills:  0          Our records show that you are taking the medicines listed below. If these are incorrect, please call your family doctor or clinic.        Dose / Directions Last dose taken    ALPRAZolam 0.5 MG tablet   Commonly known as:  XANAX   Dose:  0.5 mg   Quantity:  10 tablet        Take 1 tablet (0.5 mg) by mouth daily as needed for anxiety   Refills:  0        amphetamine-dextroamphetamine 20 MG per tablet   Commonly known as:  ADDERALL   Dose:  40 mg   Quantity:  120  tablet        Take 2 tablets (40 mg) by mouth 2 times daily In the afternoon. Patient needs generic brand with BARR    Refills:  0        FETZIMA 40 MG 24 hr capsule   Generic drug:  levomilnacipran        TK 1 C PO D   Refills:  1        hydrocortisone 2.5 % cream   Commonly known as:  ANUSOL-HC   Quantity:  15 g        Place rectally 2 times daily   Refills:  0        lamoTRIgine 25 MG tablet   Commonly known as:  LaMICtal        Refills:  1        levonorgestrel 20 MCG/24HR IUD   Commonly known as:  MIRENA   Dose:  1 each   Quantity:  1 each        1 each by Intrauterine route once.   Refills:  0          STOP taking        Dose Reason for stopping Comments    metroNIDAZOLE 500 MG tablet   Commonly known as:  FLAGYL                      Prescriptions were sent or printed at these locations (1 Prescription)                   Other Prescriptions                Printed at Department/Unit printer (1 of 1)         potassium chloride SA (POTASSIUM CHLORIDE) 20 MEQ CR tablet                Procedures and tests performed during your visit     Basic metabolic panel    CBC with platelets differential    Drug abuse screen 77 urine (FL, RH, SH)    HCG qualitative urine    Peripheral IV catheter    TSH      Orders Needing Specimen Collection     None      Pending Results     No orders found for last 3 day(s).            Pending Culture Results     No orders found for last 3 day(s).            Pending Results Instructions     If you had any lab results that were not finalized at the time of your Discharge, you can call the ED Lab Result RN at [phone]. You will be contacted by this team for any positive Lab results or changes in treatment. The nurses are available 7 days a week from 10A to 6:30P.  You can leave a message 24 hours per day and they will return your call.        Test Results From Your Hospital Stay        5/27/2017 11:05 PM      Component Results     Component Value Ref Range & Units Status    HCG  Qual Urine Negative NEG Final         5/27/2017 11:33 PM      Component Results     Component Value Ref Range & Units Status    WBC 5.5 4.0 - 11.0 10e9/L Final    RBC Count 3.62 (L) 3.8 - 5.2 10e12/L Final    Hemoglobin 11.5 (L) 11.7 - 15.7 g/dL Final    Hematocrit 33.2 (L) 35.0 - 47.0 % Final    MCV 92 78 - 100 fl Final    MCH 31.8 26.5 - 33.0 pg Final    MCHC 34.6 31.5 - 36.5 g/dL Final    RDW 12.5 10.0 - 15.0 % Final    Platelet Count 190 150 - 450 10e9/L Final    Diff Method Automated Method  Final    % Neutrophils 42.4 % Final    % Lymphocytes 41.9 % Final    % Monocytes 13.7 % Final    % Eosinophils 1.1 % Final    % Basophils 0.7 % Final    % Immature Granulocytes 0.2 % Final    Nucleated RBCs 0 0 /100 Final    Absolute Neutrophil 2.3 1.6 - 8.3 10e9/L Final    Absolute Lymphocytes 2.3 0.8 - 5.3 10e9/L Final    Absolute Monocytes 0.8 0.0 - 1.3 10e9/L Final    Absolute Eosinophils 0.1 0.0 - 0.7 10e9/L Final    Absolute Basophils 0.0 0.0 - 0.2 10e9/L Final    Abs Immature Granulocytes 0.0 0 - 0.4 10e9/L Final    Absolute Nucleated RBC 0.0  Final         5/27/2017 11:53 PM      Component Results     Component Value Ref Range & Units Status    Sodium 140 133 - 144 mmol/L Final    Potassium 2.9 (L) 3.4 - 5.3 mmol/L Final    Chloride 103 94 - 109 mmol/L Final    Carbon Dioxide 27 20 - 32 mmol/L Final    Anion Gap 10 3 - 14 mmol/L Final    Glucose 81 70 - 99 mg/dL Final    Urea Nitrogen 22 7 - 30 mg/dL Final    Creatinine 0.94 0.52 - 1.04 mg/dL Final    GFR Estimate 69 >60 mL/min/1.7m2 Final    Non  GFR Calc    GFR Estimate If Black 84 >60 mL/min/1.7m2 Final    African American GFR Calc    Calcium 9.0 8.5 - 10.1 mg/dL Final         5/27/2017 11:58 PM      Component Results     Component Value Ref Range & Units Status    TSH 0.81 0.40 - 4.00 mU/L Final         5/27/2017 11:51 PM      Component Results     Component Value Ref Range & Units Status    Amphetamine Qual Urine  NEG Final    Positive   Cutoff  for a positive amphetamine is greater than 500 ng/mL. This is an   unconfirmed screening result to be used for medical purposes only.   (A)    Barbiturates Qual Urine  NEG Final    Negative   Cutoff for a negative barbiturate is 200 ng/mL or less.      Benzodiazepine Qual Urine  NEG Final    Negative   Cutoff for a negative benzodiazepine is 200 ng/mL or less.      Cannabinoids Qual Urine  NEG Final    Negative   Cutoff for a negative cannabinoid is 50 ng/mL or less.      Cocaine Qual Urine  NEG Final    Negative   Cutoff for a negative cocaine is 300 ng/mL or less.      Opiates Qualitative Urine  NEG Final    Negative   Cutoff for a negative opiate is 300 ng/mL or less.      PCP Qual Urine  NEG Final    Negative   Cutoff for a negative PCP is 25 ng/mL or less.                  Clinical Quality Measure: Blood Pressure Screening     Your blood pressure was checked while you were in the emergency department today. The last reading we obtained was  BP: 121/84 . Please read the guidelines below about what these numbers mean and what you should do about them.  If your systolic blood pressure (the top number) is less than 120 and your diastolic blood pressure (the bottom number) is less than 80, then your blood pressure is normal. There is nothing more that you need to do about it.  If your systolic blood pressure (the top number) is 120-139 or your diastolic blood pressure (the bottom number) is 80-89, your blood pressure may be higher than it should be. You should have your blood pressure rechecked within a year by a primary care provider.  If your systolic blood pressure (the top number) is 140 or greater or your diastolic blood pressure (the bottom number) is 90 or greater, you may have high blood pressure. High blood pressure is treatable, but if left untreated over time it can put you at risk for heart attack, stroke, or kidney failure. You should have your blood pressure rechecked by a primary care provider within  "the next 4 weeks.  If your provider in the emergency department today gave you specific instructions to follow-up with your doctor or provider even sooner than that, you should follow that instruction and not wait for up to 4 weeks for your follow-up visit.        Thank you for choosing [facility]       Thank you for choosing [facility] for your care. Our goal is always to provide you with excellent care. Hearing back from our patients is one way we can continue to improve our services. Please take a few minutes to complete the written survey that you may receive in the mail after you visit with us. Thank you!        [employer] Information     [employer] lets you send messages to your doctor, view your test results, renew your prescriptions, schedule appointments and more. To sign up, go to [email or web address] . Click on \"Log in\" on the left side of the screen, which will take you to the Welcome page. Then click on \"Sign up Now\" on the right side of the page.     You will be asked to enter the access code listed below, as well as some personal information. Please follow the directions to create your username and password.     Your access code is: [email or web address]  Expires: 2017 10:34 PM     Your access code will  in 90 days. If you need help or a new code, please call your [facility] clinic or [phone].        Care EveryWhere ID     This is your Care EveryWhere ID. This could be used by other organizations to access your [facility] medical records  [id number]        After Visit Summary       This is your record. Keep this with you and show to your community pharmacist(s) and doctor(s) at your next visit.                  " Deferred dryness on face

## 2024-09-03 ENCOUNTER — OFFICE VISIT (OUTPATIENT)
Dept: FAMILY MEDICINE | Facility: CLINIC | Age: 39
End: 2024-09-03
Payer: COMMERCIAL

## 2024-09-03 VITALS
HEART RATE: 101 BPM | TEMPERATURE: 98.1 F | DIASTOLIC BLOOD PRESSURE: 86 MMHG | OXYGEN SATURATION: 100 % | RESPIRATION RATE: 18 BRPM | SYSTOLIC BLOOD PRESSURE: 136 MMHG

## 2024-09-03 DIAGNOSIS — Z11.52 ENCOUNTER FOR SCREENING FOR COVID-19: ICD-10-CM

## 2024-09-03 DIAGNOSIS — J44.1 COPD EXACERBATION (H): ICD-10-CM

## 2024-09-03 DIAGNOSIS — B34.9 VIRAL ILLNESS: Primary | ICD-10-CM

## 2024-09-03 LAB
DEPRECATED S PYO AG THROAT QL EIA: NEGATIVE
GROUP A STREP BY PCR: NOT DETECTED
SARS-COV-2 RNA RESP QL NAA+PROBE: NEGATIVE

## 2024-09-03 PROCEDURE — 99204 OFFICE O/P NEW MOD 45 MIN: CPT | Performed by: PHYSICIAN ASSISTANT

## 2024-09-03 PROCEDURE — 87651 STREP A DNA AMP PROBE: CPT | Performed by: PHYSICIAN ASSISTANT

## 2024-09-03 PROCEDURE — 87635 SARS-COV-2 COVID-19 AMP PRB: CPT | Performed by: PHYSICIAN ASSISTANT

## 2024-09-03 RX ORDER — BENZONATATE 100 MG/1
100 CAPSULE ORAL 3 TIMES DAILY PRN
Qty: 30 CAPSULE | Refills: 0 | Status: SHIPPED | OUTPATIENT
Start: 2024-09-03 | End: 2024-09-13

## 2024-09-03 RX ORDER — ALBUTEROL SULFATE 90 UG/1
2 AEROSOL, METERED RESPIRATORY (INHALATION) EVERY 6 HOURS PRN
Qty: 18 G | Refills: 0 | Status: SHIPPED | OUTPATIENT
Start: 2024-09-03 | End: 2025-03-02

## 2024-09-03 NOTE — PROGRESS NOTES
Patient presents with:  Pharyngitis: Started Saturday sore throat body aches fever diarrhea, cough that can't get out of chest SOB at the end of the day         Clinical Decision Making:  Strep test was obtained and was negative.  Culture is to follow.  COVID-19 screening test is pending.  Patient has a history of COPD and has had an exacerbation.  Albuterol inhaler was renewed and MDI spacer was dispensed in the office.  Patient is treated with Tessalon Perles and albuterol inhaler for cough.  Symptomatic care was gone over. Expected course of resolution and indication for return was gone over and questions were answered to patient/parent's satisfaction before discharge.        ICD-10-CM    1. Viral illness  B34.9 albuterol (PROAIR HFA/PROVENTIL HFA/VENTOLIN HFA) 108 (90 Base) MCG/ACT inhaler     Optichamber/Spacer Order for DME - ONLY FOR DME     benzonatate (TESSALON) 100 MG capsule      2. COPD exacerbation (H)  J44.1 albuterol (PROAIR HFA/PROVENTIL HFA/VENTOLIN HFA) 108 (90 Base) MCG/ACT inhaler     Optichamber/Spacer Order for DME - ONLY FOR DME     benzonatate (TESSALON) 100 MG capsule      3. Encounter for screening for COVID-19  Z11.52 Symptomatic COVID-19 Virus (Coronavirus) by PCR Nose      4. Fever  R50.9 Symptomatic COVID-19 Virus (Coronavirus) by PCR Nose     Streptococcus A Rapid Screen w/Reflex to PCR - Clinic Collect     Group A Streptococcus PCR Throat Swab          Patient Instructions   You were seen today for acute bronchitis. This is likely due to a viral illness.    Symptom management:  - Get plenty of rest  - Avoid smoking and second hand smoke  - May take tylenol or ibuprofen for fever/discomfort  - Drink plenty of non-caffeinated fluids  - Use nasal steroid spray for sinus congestion  - Albuterol inhaler may be used every 6 hours as needed for chest tightness      Reasons to be seen in the emergency room:  - Develop a fever of 100.4 or higher  - Cough changes, coughing up blood, or become  short of breath  - Neck stiffness  - Chest pain  - Severe headache  - Unable to tolerate eating or drinking fluids    Otherwise, if no symptom improvement after 5 days, follow-up with your primary care provider.       How can I protect others? Discharge Instructions for COVID-19 precaustions:  If you have symptoms (fever, cough, body aches or trouble breathing):  Stay home and away from others (self-isolate) until:  At least 10 days have passed since your symptoms started. And   You've had no fever--and no medicine that reduces fever--for 1 full day (24 hours). And   Your other symptoms have resolved (gotten better) OR you have a negative covid test.     Call [phone] for treatment if the COVID 19 test is positive.     HPI:  Citlali Hawley is a 39 year old female who has a past medical history for COPD who presents today for a 4-day history of sore throat odynophagia fever diarrhea and cough.  Patient states that she has had shortness of breath but no loss of taste or smell fatigue nausea and diarrhea but no vomiting myalgias arthralgias and headache.  Patient has tried over-the-counter Tylenol, ibuprofen and Vicky-Mathews without relief.  Patient performed an at home COVID test Sunday, 2 days ago.  Patient does not have an albuterol inhaler as she is out of the medication and does not have an MDI spacer for her albuterol inhaler.    History obtained from chart review and the patient.    Problem List:  2018-01: Alcohol withdrawal (H)  2018-01: Anxiety  2018-01: Chemical dependency (H)  2017-02: BV (bacterial vaginosis)  2016-04: Health Care Home  2014-11: Abnormal electrocardiography  2014-11: Alcohol use disorder, severe, dependence (H)  2014-02: Purging (H28)  2014-02: Bulimia nervosa (H28)  2014-02: Mild major depression (H)  2014-02: Attention deficit disorder  2013-04: XIANG II (cervical intraepithelial neoplasia II)  2013-03: Smoker  2012-05: Mirena IUD insertion  2011-07: Marginal placenta  previa  2011-07: Mild recurrent major depression (H24)  2011-07: Encounter for supervision of other normal pregnancy  2011-06: Smoking complicating pregnancy, childbirth, or the puerperium  2011-03: XIANG III (cervical intraepithelial neoplasia III)  2010-07: Cystic acne  2010-02: CARDIOVASCULAR SCREENING; LDL GOAL LESS THAN 160  2009-02: Panic disorder      Past Medical History:   Diagnosis Date    ASCUS on Pap smear 4/12,7/12    + HPV undeterimined risk, colpo XIANG I    Bulimia     XIANG 3 - cervical intraepithelial neoplasia grade 3     LEEP 3/24/11    Depression     Depressive disorder     Eating disorder 2/27/2014    History of colposcopy with cervical biopsy 3/11/11,7/12    XIANG I,II,III    Panic disorder 2/3/2009       Social History     Tobacco Use    Smoking status: Some Days     Current packs/day: 1.00     Average packs/day: 1 pack/day for 15.0 years (15.0 ttl pk-yrs)     Types: Cigarettes    Smokeless tobacco: Former   Substance Use Topics    Alcohol use: Yes     Comment: 1 Liter of Vodka for past 5 weeks       Review of Systems  As above in HPI otherwise negative.    Vitals:    09/03/24 1153   BP: 136/86   Pulse: 101   Resp: 18   Temp: 98.1  F (36.7  C)   TempSrc: Oral   SpO2: 100%       General: Patient is resting comfortably no acute distress is afebrile  HEENT: Head is normocephalic atraumatic   eyes are PERRL EOMI sclera anicteric   TMs are clear bilaterally  Throat is with mild pharyngeal wall erythema and no exudate  No cervical lymphadenopathy present  LUNGS: Clear to auscultation bilaterally  HEART: Regular rate and rhythm  Skin: Without rash non-diaphoretic    Physical Exam      Labs:  Results for orders placed or performed in visit on 09/03/24   Symptomatic COVID-19 Virus (Coronavirus) by PCR Nose     Status: Normal    Specimen: Nose; Swab   Result Value Ref Range    SARS CoV2 PCR Negative Negative    Narrative    Testing was performed using the Aptima SARS-CoV-2 Assay on the  [email or web address] Instrument  System. Additional information about this  Emergency Use Authorization (EUA) assay can be found via the Lab  Guide. This test should be ordered for the detection of SARS-CoV-2 in  individuals who meet SARS-CoV-2 clinical and/or epidemiological  criteria. Test performance is unknown in asymptomatic patients. This  test is for in vitro diagnostic use under the FDA EUA for  laboratories certified under CLIA to perform high complexity testing.  This test has not been FDA cleared or approved. A negative result  does not rule out the presence of PCR inhibitors in the specimen or  target RNA in concentration below the limit of detection for the  assay. The possibility of a false negative should be considered if  the patient's recent exposure or clinical presentation suggests  COVID-19. This test was validated by the [facility]. This laboratory is certified under  the Clinical Laboratory Improvement Amendments of 1988 (CLIA-88) as  qualified to perform high complexity laboratory testing.   Streptococcus A Rapid Screen w/Reflex to PCR - Clinic Collect     Status: Normal    Specimen: Throat; Swab   Result Value Ref Range    Group A Strep antigen Negative Negative   Group A Streptococcus PCR Throat Swab     Status: Normal    Specimen: Throat; Swab   Result Value Ref Range    Group A strep by PCR Not Detected Not Detected    Narrative    The Xpert Xpress Strep A test, performed on the [employer], is a rapid, qualitative in vitro diagnostic test for the detection of Streptococcus pyogenes (Group A ß-hemolytic Streptococcus, Strep A) in throat swab specimens from patients with signs and symptoms of pharyngitis. The Xpert Xpress Strep A test can be used as an aid in the diagnosis of Group A Streptococcal pharyngitis. The assay is not intended to monitor treatment for Group A Streptococcus infections. The Xpert Xpress Strep A test utilizes an automated real-time  polymerase chain reaction (PCR) to detect Streptococcus pyogenes DNA.     At the end of the encounter, I discussed results, diagnosis, medications. Discussed red flags for immediate return to clinic/ER, as well as indications for follow up if no improvement. Patient understood and agreed to plan. Patient was stable for discharge.

## 2024-09-03 NOTE — PATIENT INSTRUCTIONS
You were seen today for acute bronchitis. This is likely due to a viral illness.    Symptom management:  - Get plenty of rest  - Avoid smoking and second hand smoke  - May take tylenol or ibuprofen for fever/discomfort  - Drink plenty of non-caffeinated fluids  - Use nasal steroid spray for sinus congestion  - Albuterol inhaler may be used every 6 hours as needed for chest tightness      Reasons to be seen in the emergency room:  - Develop a fever of 100.4 or higher  - Cough changes, coughing up blood, or become short of breath  - Neck stiffness  - Chest pain  - Severe headache  - Unable to tolerate eating or drinking fluids    Otherwise, if no symptom improvement after 5 days, follow-up with your primary care provider.       How can I protect others? Discharge Instructions for COVID-19 precaustions:  If you have symptoms (fever, cough, body aches or trouble breathing):  Stay home and away from others (self-isolate) until:  At least 10 days have passed since your symptoms started. And   You've had no fever--and no medicine that reduces fever--for 1 full day (24 hours). And   Your other symptoms have resolved (gotten better) OR you have a negative covid test.     Call [phone] for treatment if the COVID 19 test is positive.

## 2024-09-03 NOTE — LETTER
September 3, 2024      Citlali Hawley  [address] [facility] [address] [address]        To Whom It May Concern:    Citlali Hawley was seen in our clinic. She may return to work without restrictions 9/4/24.      Sincerely,        Paul Ahumada PA-C